# Patient Record
Sex: MALE | Race: WHITE | NOT HISPANIC OR LATINO | Employment: OTHER | URBAN - METROPOLITAN AREA
[De-identification: names, ages, dates, MRNs, and addresses within clinical notes are randomized per-mention and may not be internally consistent; named-entity substitution may affect disease eponyms.]

---

## 2023-03-07 ENCOUNTER — TELEPHONE (OUTPATIENT)
Dept: URGENT CARE | Facility: URGENT CARE | Age: 58
End: 2023-03-07

## 2023-03-07 NOTE — TELEPHONE ENCOUNTER
Dr. Canchola,  You have never seen this pt. Pt has upcoming appt scheduled with you for 3/14. Are you ok with following pt for home care?     Stephanie Coates RN  [facility]

## 2023-03-07 NOTE — TELEPHONE ENCOUNTER
Josiane with [facility]  is calling to know if Dr. Canchola is ok with following patient for HHC orders.     Has establish care visit scheduled for 3/14/23.    Call back okay.     Daina Ahumada RN  [facility]

## 2023-03-07 NOTE — TELEPHONE ENCOUNTER
I am okay if the patient is not complicated with severe co-morbidities. If he does, he may benefit from being seen by Internal medicine    Thanks for the heads up     MG

## 2023-03-08 NOTE — TELEPHONE ENCOUNTER
Called and left detailed message with provider's message. Call back to [phone] if any further questions or concerns.    Stephanie Coates RN  [facility]

## 2023-03-09 ENCOUNTER — TELEPHONE (OUTPATIENT)
Dept: FAMILY MEDICINE | Facility: CLINIC | Age: 58
End: 2023-03-09

## 2023-03-09 NOTE — TELEPHONE ENCOUNTER
I have never seen this patient before. Unable to prescribe pain medications at this time until evaluated.     When he is seen for his appointment on 3/14 we can discuss medication options for pain control at that time       Dr. Barbara Canchola

## 2023-03-09 NOTE — TELEPHONE ENCOUNTER
FYI - Status Update    Who is Calling: nurse, Calling from [facility]    Update: Calling to inform us that patient is starting home health care 3x week for wound vac. PT will be coming out to do an evaluation. He will also have a PCA for bathing needs. Patient is requesting for Pain medication to take 3x a week to assist with wound vac changes     Does caller want a call/response back: Yes     Okay to leave a detailed message?: Yes at Other phone number:  Radha can be reached at [phone]

## 2023-03-10 NOTE — TELEPHONE ENCOUNTER
Called Radha with [employer] and relayed message from Dr. Canchola. She verbalized understanding and will notify patient.     Isaura Chauhan RN   [facility]

## 2023-03-15 ENCOUNTER — TELEPHONE (OUTPATIENT)
Dept: FAMILY MEDICINE | Facility: CLINIC | Age: 58
End: 2023-03-15

## 2023-03-15 NOTE — TELEPHONE ENCOUNTER
Radha ([facility]) calling regarding patient;s wound vac supplies    Patient had previously had care through [facility] and was receiving wound vac supplies through them, now discharged from TCU and is needing new orders for wound vac supplies, patient receives from [facility].    Writer did inform caller that patient cancelled appointment today and until patient is seen and establish care our provider's cannot sign orders, see TE 3/9/23. Caller stated understanding and that she would let patient know.    Radha #: [phone]    BK ClayN RN  [facility]

## 2023-03-22 ENCOUNTER — VIRTUAL VISIT (OUTPATIENT)
Dept: FAMILY MEDICINE | Facility: CLINIC | Age: 58
End: 2023-03-22
Payer: MEDICAID

## 2023-03-22 DIAGNOSIS — Z90.49 S/P LAPAROSCOPIC APPENDECTOMY: Primary | ICD-10-CM

## 2023-03-22 PROCEDURE — 99441 PR PHYSICIAN TELEPHONE EVALUATION 5-10 MIN: CPT | Mod: 93 | Performed by: STUDENT IN AN ORGANIZED HEALTH CARE EDUCATION/TRAINING PROGRAM

## 2023-03-22 RX ORDER — TRAMADOL HYDROCHLORIDE 50 MG/1
50 TABLET ORAL EVERY 6 HOURS PRN
Qty: 30 TABLET | Refills: 0 | Status: SHIPPED | OUTPATIENT
Start: 2023-03-22 | End: 2023-07-07

## 2023-03-22 NOTE — PROGRESS NOTES
DME (Durable Medical Equipment) Orders and Documentation  Orders Placed This Encounter   Procedures     Wound Vac DME      The patient was assessed and it was determined the patient is in need of the following listed DME Supplies/Equipment. Please complete supporting documentation below to demonstrate medical necessity.      DME All Other Item(s) Documentation    List reason for need and supporting documentation for medical necessity below for each DME item.     1. Patient is in need for Wound Vac supplies (canisters). Already has a wound vac and needs to continue to have supplies. Patient receives from [facility]

## 2023-03-22 NOTE — PROGRESS NOTES
"Delon is a 58 year old who is being evaluated via a billable video visit.      How would you like to obtain your AVS? Mail a copy  If the video visit is dropped, the invitation should be resent by: Text to cell phone: [phone]   Will anyone else be joining your video visit? No  {If patient encounters technical issues they should call [phone] :505077}        {PROVIDER CHARTING PREFERENCE:425692}    Subjective   Delon is a 58 year old, presenting for the following health issues:  WOUND CARE  No flowsheet data found.  HPI       Patient is requesting supplies for his wound.   LAPAROTOMY EXPLORATION, APPENDECTOMY was done  - 2/7/2023      {SUPERLIST (Optional):687625}  {additonal problems for provider to add (Optional):983654}      Review of Systems   {ROS COMP (Optional):945976}      Objective           Vitals:  No vitals were obtained today due to virtual visit.    Physical Exam   {video visit exam brief selected:760637::\"GENERAL: Healthy, alert and no distress\",\"EYES: Eyes grossly normal to inspection.  No discharge or erythema, or obvious scleral/conjunctival abnormalities.\",\"RESP: No audible wheeze, cough, or visible cyanosis.  No visible retractions or increased work of breathing.  \",\"SKIN: Visible skin clear. No significant rash, abnormal pigmentation or lesions.\",\"NEURO: Cranial nerves grossly intact.  Mentation and speech appropriate for age.\",\"PSYCH: Mentation appears normal, affect normal/bright, judgement and insight intact, normal speech and appearance well-groomed.\"}    {Diagnostic Test Results (Optional):358249}    {AMBULATORY ATTESTATION (Optional):506878}        Video-Visit Details    Type of service:  Video Visit   Video Start Time: {video visit start/end time for provider to select:486567}  Video End Time:{video visit start/end time for provider to select:407361}    Originating Location (pt. Location): Home  {PROVIDER LOCATION On-site should be selected for visits conducted from your clinic " "location or adjoining [facility] hospital, academic office, or other nearby [facility] building. Off-site should be selected for all other provider locations, including home:[address]}  Distant Location (provider location):  On-site  Platform used for Video Visit: {Virtual Visit Platforms:851811::\"[employer]Well\"}    " General

## 2023-03-22 NOTE — PROGRESS NOTES
Delon is a 58 year old who is being evaluated via a billable telephone visit.      What phone number would you like to be contacted at? [phone]   How would you like to obtain your AVS? Mail a copy    Distant Location (provider location):  On-site    Assessment & Plan     (Z90.49) S/P laparoscopic appendectomy  (primary encounter diagnosis)  Comment: Chronic, stable. Pt being treated with home health by [facility] and need wound vac supplies. Will place order. Due to pain with changes of wound vac will provide tramadol for pain control. PMDP checked prior to prescribing.   Plan: Wound Vac DME, traMADol (ULTRAM) 50 MG tablet        Pending                    ALEX BLACKWELL MD  [facility]    Subjective   Delon is a 58 year old, presenting for the following health issues:  WOUND CARE  No flowsheet data found.  HPI      Patient is requesting supplies for his wound.   LAPAROTOMY EXPLORATION, APPENDECTOMY was done  - 2/7/2023  Patient reports that he has been doing okay and states that he gets his wound VAC changed every other day for wound care.  He reports that his home health has encouraged him to obtain strips for his wound VAC and is hoping for a DME order.  Patient states that he also experiences pain with changing of his wound VAC strips and is hoping to obtain pain medications to help alleviate symptoms.              Review of Systems   Constitutional, HEENT, cardiovascular, pulmonary, gi and gu systems are negative, except as otherwise noted.      Objective           Vitals:  No vitals were obtained today due to virtual visit.    Physical Exam   healthy, alert and no distress  PSYCH: Alert and oriented times 3; coherent speech, normal   rate and volume, able to articulate logical thoughts, able   to abstract reason, no tangential thoughts, no hallucinations   or delusions  His affect is normal  RESP: No cough, no audible wheezing, able to talk in full sentences  Remainder of exam unable to  be completed due to telephone visits    No results found for any visits on 03/22/23.            Phone call duration: 9 minutes

## 2023-03-23 ENCOUNTER — TELEPHONE (OUTPATIENT)
Dept: FAMILY MEDICINE | Facility: CLINIC | Age: 58
End: 2023-03-23
Payer: MEDICAID

## 2023-03-23 DIAGNOSIS — Z53.9 DIAGNOSIS NOT YET DEFINED: Primary | ICD-10-CM

## 2023-03-23 PROCEDURE — 99207 PR MD CERTIFICATION HHA PATIENT: CPT | Performed by: STUDENT IN AN ORGANIZED HEALTH CARE EDUCATION/TRAINING PROGRAM

## 2023-03-23 NOTE — TELEPHONE ENCOUNTER
[facility] signed by Dr. Canchola and faxed back to [phone].  Connie Arana,

## 2023-03-23 NOTE — TELEPHONE ENCOUNTER
Reason for Call:  Form, our goal is to have forms completed with 72 hours, however, some forms may require a visit or additional information.    Type of letter, form or note:  Home Health Certification    Who is the form from?: Home care    Where did the form come from: form was faxed in    What clinic location was the form placed at?: [facility]    Where the form was placed: Given to physician    What number is listed as a contact on the form?: [phone]       Call taken on 3/23/2023 at 10:06 AM by Connie Arana

## 2023-03-24 ENCOUNTER — TELEPHONE (OUTPATIENT)
Dept: URGENT CARE | Facility: URGENT CARE | Age: 58
End: 2023-03-24

## 2023-03-24 NOTE — TELEPHONE ENCOUNTER
Routing to team pink    Please route to TC pool to watch for forms for Dr. Canchola to sign.    [employer] has not received order for wound Vac supplies. [employer] will be faxing form to be completed.      RN received call from Yelena with [facility] regarding above.    Patient has not received wound vac supplies and they need to be ordered.    Per Yelena, supplies need to be ordered thru [employer].    RN called and spoke with Radha with [employer]. [phone]    [employer] will fax forms to clinic to be filled out and to be faxed back    When forms are received an completed,  Please fax back to number on forms.       David Hennessy, RN, BSN, PHN  [facility]

## 2023-03-27 ENCOUNTER — TELEPHONE (OUTPATIENT)
Dept: URGENT CARE | Facility: URGENT CARE | Age: 58
End: 2023-03-27

## 2023-03-27 NOTE — TELEPHONE ENCOUNTER
"Form received which is a \"[facility] VAC Therapy Insurance Authorization Form\".  I called Yelena to let her know.  She said that this doesn't make sense and confused as to why they can't find him in their system?  She doesn't believe this form needs to be filled out as he was sent home from the hospital already with the system.  She will do some digging around and get back to us if she finds out what they need from us. Will hold on to this form for now.  Connie Arana,     "

## 2023-03-27 NOTE — TELEPHONE ENCOUNTER
I called [employer] . (ph: [phone]) and spoke with Harjeet.  She was not able to find this patient in their system.  She will fax a form to us.  Yelena called and informed.  Skip would like a call back once we receive the form.    Connie Arana,

## 2023-03-31 ENCOUNTER — TELEPHONE (OUTPATIENT)
Dept: FAMILY MEDICINE | Facility: CLINIC | Age: 58
End: 2023-03-31
Payer: MEDICAID

## 2023-03-31 NOTE — TELEPHONE ENCOUNTER
What orders need to be written out. Specific supplies should be detailed by the company as to what exactly they need in order to provide treatment     Thanks    MG

## 2023-03-31 NOTE — TELEPHONE ENCOUNTER
Radha [facility]  care [facility] RN calling to get orders for wound vac care.     They need written orders to be able to order the supplies.     Please write orders and then fax them to the below agency as they are the ones that order the supplies.     [facility] needs orders for wound vac supplies     Fax: [phone]    Please have staff reach out to Radha when this has been completed.     Best number for her is [phone]      Belen Holland RN  [facility]

## 2023-04-03 NOTE — TELEPHONE ENCOUNTER
Attempted to call RN, left  asking for return call.     Thanks,  ELENA Shipley  [facility]

## 2023-04-03 NOTE — TELEPHONE ENCOUNTER
"Radha Auguste, RN returned call.Per RN, \" He needs sponge kit, canisters, doing cares 3x per week\".     Per RN has been waiting for orders since March 3rd, patient has still not received his wound vac. Has tried to fax the order that was written and they wouldn't accept this and she tries to reach out to the company for info to give us and is \"tried of being the middle man\". Per RN \" I don't know what else to do\".     [facility]: Spoke with rep who stated, \"we need to create an account so we need chart notes and face to face notes, face sheet, insurance\", \"this should come from the facility upon discharge to home\".     [facility] in [place]: Attempted to call facility, was going to be transferred to RN but got disconnected. Attempted to call x4 and unable to get through to anyone. Orders will have to come from [facility] as pt did not have a face to face with Dr. Canchola. Will have to try calling again to give above message from [facility].       Thanks,  ELENA Shipley  [facility]     "

## 2023-04-05 NOTE — TELEPHONE ENCOUNTER
Yelena with [facility] calling back to check status of wound vac supplies.  Updated her on Violetta's note below.  Yelena will update patient on status.    Per Yelena, [facility] already has an account for patient in their system but they are just waiting for orders and documents.    Jackson Nunes RN  [facility]

## 2023-04-05 NOTE — TELEPHONE ENCOUNTER
I attempted to call [facility] in [place] x3 and no one answers/ no voice mail available.     This patient needs the wound vac order faxed along with f2f notes which we do not have but we can try sending the virtual visit to the company and they need a face sheet with insurance information.       I will print and fax to [facility]. Called and updated home care RN.     Called [facility] to assure fax received- April with intake stated it can take up to an hour to receive the fax. In the meantime, April stated that rehab center never alerted them that he was discharged home and this is why pt has not received supplies yet. Once fax received they can do intake and send out supplies. I told rep that they can be transferred to me.     Called and updates RN via .       Thanks,  ELENA Shipley  [facility]

## 2023-04-13 ENCOUNTER — MEDICAL CORRESPONDENCE (OUTPATIENT)
Dept: HEALTH INFORMATION MANAGEMENT | Facility: CLINIC | Age: 58
End: 2023-04-13
Payer: MEDICAID

## 2023-04-25 ENCOUNTER — TELEPHONE (OUTPATIENT)
Dept: FAMILY MEDICINE | Facility: CLINIC | Age: 58
End: 2023-04-25
Payer: MEDICAID

## 2023-04-25 DIAGNOSIS — Z90.49 S/P LAPAROSCOPIC APPENDECTOMY: Primary | ICD-10-CM

## 2023-04-25 NOTE — TELEPHONE ENCOUNTER
Yelena PARKER from [facility] calling with an update on pt's wound. Wound is progressing fantastically. Drainage in wound vac cannister has decreased significantly. Has been   changing once every 5 days.     Would Dr. Canchola want to see pt again soon to assess wound to determine if ok to discontinue the wound vac or different wound care orders? Continue with the wound vac?    Depth has gotten superficial and is healing well  Wound measurements: L 19.5cm x 3cm D not there anymore.    Yelena can be reached at [phone] ok to leave a message    Stephanie Coates RN  [facility]

## 2023-04-26 ENCOUNTER — TELEPHONE (OUTPATIENT)
Dept: FAMILY MEDICINE | Facility: CLINIC | Age: 58
End: 2023-04-26
Payer: MEDICAID

## 2023-04-26 NOTE — TELEPHONE ENCOUNTER
Yelena called back and was updated with referral and number to schedule  She will let patient know    Jackson Nunes RN  [facility]

## 2023-04-26 NOTE — LETTER
April 26, 2023    To  Delon Dasilva  [address] [place] [place] [address]    Your team at [facility] cares about your health. We have reviewed your chart and based on our findings; we are making the following recommendations to better manage your health.     You are in particular need of attention regarding the following:     Call or MyChart message your clinic to schedule a colonoscopy, schedule/ a FIT Test, or order a Cologuard test. If you are unsure what type of test you need, please call your clinic and speak to clinic staff.   Colon cancer is now the second leading cause of cancer-related deaths in the [facility] for both men and women and there are over 130,000 new cases and 50,000 deaths per year from colon cancer. Colonoscopies can prevent 90-95% of these deaths. Problem lesions can be removed before they ever become cancer. This test is not only looking for cancer, but also getting rid of precancerous lesions.   If you are under/uninsured, we recommend you contact the [employer] Program.[employer] is a free colorectal cancer screening program that provides colonoscopies for eligible under/uninsured [place] men and women. If you are interested in receiving a free colonoscopy, please call [employer] at t [phone] (mention code [employer]) to see if you're eligible. Please have them send us the results.   Please call [phone] to schedule a colonoscopy.  Contact your clinic to schedule/ your FIT Test.   Schedule an office visit with your provider if you are interested in completing your colon cancer screening with a Cologuard test  PREVENTATIVE VISIT: Physical    If you have already completed these items, please contact the clinic via phone or   [employer] so your care team can review and update your records. Thank you for   choosing [facility] for your healthcare needs. For any questions,   concerns, or to schedule an appointment please contact our  clinic.    Healthy Regards,      Your  Health [facility] Team

## 2023-04-26 NOTE — TELEPHONE ENCOUNTER
Left message on Yelena's VM requesting a return call to [facility] [phone]   VM did not have a greeting, did not leave a detailed message    Jackson Nunes RN  [facility]

## 2023-04-26 NOTE — TELEPHONE ENCOUNTER
Spoke with ELENA Kirk and gave PCP message as below.     ELENA Kirk states that patient had never followed up with anyone since his surgery.     Per encounters patient was scheduled to have a surgical follow up with Daniel Wells MD on March 14th but patient did not attend appointment.     Currently she does not have anyone to get orders from.     Ok for referral for general surgery to see patient?     Daina Ahumada RN  [facility]

## 2023-04-26 NOTE — TELEPHONE ENCOUNTER
I think it would be important for patient to be evaluated by surgery as they were the ones who placed the wound vac post operatively. It is normally their call as to when to determine when a wound vac can be discontinued or not. Recommend scheduling with surgeon who performed pts surgery.     Great to hear that he is doing great overall    MG

## 2023-04-26 NOTE — TELEPHONE ENCOUNTER
Patient Quality Outreach    Patient is due for the following:   Colon Cancer Screening  Physical Preventive Adult Physical    Next Steps:   Schedule a Adult Preventative    Type of outreach:    Sent letter.      Questions for provider review:    None     Riana Chavez  Chart routed to Care Team.

## 2023-05-10 ENCOUNTER — TELEPHONE (OUTPATIENT)
Dept: FAMILY MEDICINE | Facility: CLINIC | Age: 58
End: 2023-05-10
Payer: COMMERCIAL

## 2023-05-10 DIAGNOSIS — Z53.9 DIAGNOSIS NOT YET DEFINED: Primary | ICD-10-CM

## 2023-05-10 PROCEDURE — 99207 PR MD RECERTIFICATION HHA PT: CPT | Performed by: STUDENT IN AN ORGANIZED HEALTH CARE EDUCATION/TRAINING PROGRAM

## 2023-05-10 NOTE — TELEPHONE ENCOUNTER
[facility] signed by Dr. Canchola and faxed back to [phone].  Connie Arana,

## 2023-05-10 NOTE — TELEPHONE ENCOUNTER
Reason for Call:  Form, our goal is to have forms completed with 72 hours, however, some forms may require a visit or additional information.    Type of letter, form or note:  Home Health Certification    Who is the form from?: Home care    Where did the form come from: form was faxed in    What clinic location was the form placed at?: [facility]    Where the form was placed: Given to physician    What number is listed as a contact on the form?: [phone]       Call taken on 5/10/2023 at 8:35 AM by Connie Arana

## 2023-05-26 ENCOUNTER — OFFICE VISIT (OUTPATIENT)
Dept: SURGERY | Facility: CLINIC | Age: 58
End: 2023-05-26
Payer: COMMERCIAL

## 2023-05-26 VITALS — WEIGHT: 315 LBS | HEART RATE: 74 BPM | DIASTOLIC BLOOD PRESSURE: 76 MMHG | SYSTOLIC BLOOD PRESSURE: 136 MMHG

## 2023-05-26 DIAGNOSIS — T14.8XXA OPEN WOUND: Primary | ICD-10-CM

## 2023-05-26 PROCEDURE — 99203 OFFICE O/P NEW LOW 30 MIN: CPT | Performed by: SURGERY

## 2023-05-26 NOTE — PROGRESS NOTES
Dear Barbara Auguste  I have seen Delon Dasilva and as you know his chief complaint is his wound is healing and does he need the wound vac.   His wounds look great and is al most healed.  Good granulation tissue and is just below the skin level.   Do damp to dry dressing changes and follow up with me in a month and will see how it is healing.    May have to use silver nitrate on it if the tissue grows above the wound.   Has no complains of abdomen pain.   Patient has a good chance of getting a hernia.   But may exercise. No limits on  That.         HPI:  Patient is a 58 year old male  with complaints see above    No past medical history on file.    No past surgical history on file.    Social History     Socioeconomic History     Marital status: Single     Spouse name: Not on file     Number of children: Not on file     Years of education: Not on file     Highest education level: Not on file   Occupational History     Not on file   Tobacco Use     Smoking status: Never     Smokeless tobacco: Never   Vaping Use     Vaping status: Never Used   Substance and Sexual Activity     Alcohol use: Not on file     Drug use: Not on file     Sexual activity: Not on file   Other Topics Concern     Not on file   Social History Narrative     Not on file     Social Determinants of Health     Financial Resource Strain: Not on file   Food Insecurity: Not on file   Transportation Needs: Not on file   Physical Activity: Not on file   Stress: Not on file   Social Connections: Not on file   Intimate Partner Violence: Not on file   Housing Stability: Not on file        Current Outpatient Medications   Medication Sig Dispense Refill     traMADol (ULTRAM) 50 MG tablet Take 1 tablet (50 mg) by mouth every 6 hours as needed for moderate to severe pain or severe pain (7-10) Can take every 4 to 6 hours and up to 2 hours prior to wound vac change 30 tablet 0       Physical exam:  Patient able to get up on table without difficulty.  Head  eyes, nose and mouth within normal limits.  Abdomen is abdomen is soft without significant tenderness, masses, organomegaly or guarding  bowel sounds are positive and no caput medusa noted.    Lower extremity edema is not present.    Ct scan shows:    EXAM: CT CHEST ABDOMEN PELVIS W   LOCATION: [facility]   DATE/TIME: 2/18/2023 4:43 PM     INDICATION: Suspect PE and also abdominal abscess.   COMPARISON: CTs of the abdomen and pelvis dated 2/13/2023 and 2/7/2023. Chest x-ray dated 02/14/2023.   TECHNIQUE: CT scan of the chest, abdomen, and pelvis was performed following injection of IV contrast. Multiplanar reformats were obtained. Dose reduction techniques were used.   CONTRAST: Omnipaque 350 149 ml.     FINDINGS:   LUNGS AND PLEURA: Low lung volumes. Bibasilar linear atelectasis. No consolidation or infiltrate. Few punctate calcified granulomas. No pleural effusion or pneumothorax.     MEDIASTINUM/AXILLAE: Right upper extremity PICC tip terminates in the right atrium. Thin nonocclusive filling defect in the right internal jugular vein extending into the proximal brachiocephalic vein, status post removal of previously seen right IJ central venous catheter (series 5, image 62). This likely represents a fibrin sheath.     Cardiac size is within normal limits. No pericardial effusion. Normal caliber thoracic aorta. Exam was not tailored for evaluation of pulmonary embolism. However, there is no central pulmonary embolism. No lymphadenopathy.     CORONARY ARTERY CALCIFICATION: Mild.     HEPATOBILIARY: 2.5 cm peripherally calcified stone in the neck of the gallbladder. There is mild fat stranding along the neck of the gallbladder (series 5, image 66). No pericholecystic fluid. No other duct dilatation. No worrisome liver lesions.     PANCREAS: Normal.     SPLEEN: Normal.     ADRENAL GLANDS: Normal.     KIDNEYS/BLADDER: Small left lower pole renal cortical cysts, which does not require follow-up. Otherwise normal.      BOWEL: Postoperative changes of appendectomy. Trace free fluid in the adjacent right lower quadrant. No loculated fluid collection. Scattered areas of mesenteric edema throughout the right lower quadrant. No pneumoperitoneum.     Dilated loops of small bowel, measuring up to 5.6 cm in diameter, with associated air-fluid levels. These taper to decompressed distal small bowel in the left lower quadrant. Small volume of fluid and gas in the colon. No pneumatosis or portal venous gas.     LYMPH NODES: Normal.     VASCULATURE: No abdominal aortic aneurysm.     PELVIC ORGANS: Normal.     MUSCULOSKELETAL: Mild anasarca. Midline laparotomy wound with wound VAC in place. No associated fluid collection. Mild hypertrophic degenerative changes of the thoracolumbar spine.     IMPRESSION:   1.  Exam was not tailored for evaluation of pulmonary embolism. There is no central pulmonary embolism.     2.  Low lung volumes with bibasilar platelike atelectasis. No acute infiltrate.     3.  Probable thin fibrin sheath within the right internal jugular and brachiocephalic veins status post central venous catheter removal.     4.  Appendectomy. Trace right lower quadrant free fluid and scattered areas of right lower quadrant mesenteric edema. No loculated abscess.     5.  Midline laparotomy wound with wound VAC in place. No associated fluid collection.     6.  Dilated small bowel loops, which taper to decompressed distal small bowel in the left lower quadrant. Given recent surgery, findings favor adynamic ileus. However, mechanical small bowel obstruction would have a similar pattern.     7.  Cholelithiasis with mild stranding along the neck of the gallbladder. This is nonspecific and may be related to cholecystitis or edematous state. Consider abdominal ultrasound and/or hepatobiliary scan.     8.  Hypervolemia, as evidenced by anasarca.  For Patients: As a result of the 21st Century Cures Act, medical imaging exams and procedure  reports are released immediately into your electronic medical record. You may view this report before your referring provider. If you have questions, please contact your health care provider.     EXAM: CT ABDOMEN PELVIS WO   LOCATION: [facility]   DATE/TIME: 2/7/2023 9:46 PM     INDICATION: Abdominal distention, loose stools, vomiting.   COMPARISON: None.   TECHNIQUE: CT scan of the abdomen and pelvis was performed without IV contrast. Multiplanar reformats were obtained. Dose reduction techniques were used.   CONTRAST: None.     FINDINGS:   LOWER CHEST: Bibasilar atelectasis. Elevation right hemidiaphragm.     HEPATOBILIARY: Cholelithiasis. No bile duct dilatation. Liver unremarkable.     PANCREAS: Normal.     SPLEEN: Normal.     ADRENAL GLANDS: Normal.     KIDNEYS/BLADDER: Normal.     BOWEL: Gas and fluid-filled stomach. Multiple prominently gas and fluid dilated loops of proximal small bowel with transition point identified in the mid abdomen centrally (series 2, image [id number]) consistent with mechanical small bowel obstruction. Additionally, there is scattered free intraperitoneal air most prominent in the pelvis adjacent to the sigmoid colon with additional free air in the upper abdomen. The mid sigmoid colon appears mildly thick-walled with inflammatory changes and edema in the mesentery in the lower abdomen and pelvis. Small amount of free fluid in pelvis. Findings consistent with ruptured viscus likely sigmoid colon; however, given small bowel obstruction, a small bowel origin for free air is not excluded. Surgical consultation recommended.     LYMPH NODES: Normal.     VASCULATURE: Mild aortic calcification.     PELVIC ORGANS: Normal.     MUSCULOSKELETAL: Hypertrophic and degenerative changes thoracic and upper lumbar spine. Periumbilical hernia containing mesenteric fat and small amount of ascites.      Most recent labs:   No results found for any previous visit.      Ref Range & Units 3 mo ago   WHITE  BLOOD COUNT         4.5 - 11.0 thou/cu mm 11.7 High     RED BLOOD COUNT           4.30 - 5.90 mil/cu mm 3.77 Low     HEMOGLOBIN                13.5 - 17.5 g/dL 11.7 Low     HEMATOCRIT                37.0 - 53.0 % 35.8 Low     MCV                       80 - 100 fL 95    MCH                       26.0 - 34.0 pg 31.0    MCHC                      32.0 - 36.0 g/dL 32.7    RDW                       11.5 - 15.5 % 13.3    PLATELET COUNT            140 - 440 thou/cu mm 345    MPV                       6.5 - 11.0 fL 9.7    NRBC                      % 0.0    ABS NRBC thou /cu mm 0.0    Resulting Agency  [facility]   Specimen Collected: 02/23/23           Assessment:   Time spent with patient and family with greater than 50% of the time in discussion was 40 minutes. This also included looking at films and looking over the chart and discussion with other physicians involved with the patient care.    Abimael Rhodes MD      Sincerely   Abimael Rhodes MD    HOSPITALIST DISCHARGE SUMMARY   [facility]     Admission Date: 2/7/2023  Discharge Date: 2/23/2023    Discharge Plan: Delon Dasilva was discharged to skilled nursing facility.    Principal Diagnosis   Severe sepsis with septic shock  Perforated appendicitis with purulent peritonitis  Post op illeus  KIYA due to AKN  Acute hypoxic respiratory failure  Metabolic acidosis  Shock liver  Electrolyte abnormality  Morbid obesity.     Hospital Problem List   Principal Problem:  Severe sepsis (HC)  Active Problems:  Bowel perforation (HC)  Acute renal failure due to tubular necrosis (HC)  Morbid obesity with BMI of 50.0-59.9, adult (HC)  Appendicitis    ADDITIONAL COMMENTS REGARDING DIAGNOSIS SPECIFICITY  Additional Diagnosis Information         BMI>40, which is consistent with MORBID OBESITY. This is clinically significant due to increased nursing cares, use of resources and specialty equipment.       Hospital Course     Delon Dasilva is a 57 y.o. male with a past  medical history significant for obesity who presents with several weeks of abdominal bloating, shortness of breath, intermittent vomiting. He was found to have a perforated viscous on initial work up. He was taken to the OR where peritonitis due to perforated appendicitis was noted on exploratory laparotomy. Given the enormous amount of subcutaneous tissue and the purulent fluid and terrible infection, he is was at such an incredibly high risk for a wound infection and surgeon decided to leave his skin open and packed it with multiple Kerlix gauzes.  He was admitted to the ICU and remained intubated due to septic shock. He developed acute renal failure. Nephrology and critical care medicine were consulted. The patient was continued on empiric antibiotics.   picc line placed for iv antibiotics .  With fluid/ surgery/ antibiotics sepsis resolved. Extubated, weaned off pressors. Patient also developed KIYA due to ATN/ sepsis, which has resolved also with treatment of sepsis. Post op course complicated by post op ileus and drug rash from Zosyn which has been improving since he was switched to ertapenem. Midline surgical wound is healing well with wound vac.   Antibiotics switched to oral on discharge with plan to continue till 03/04.   On discharge patient had full return of bowel function and ambulating with help.   He was discharged to [facility] for further ongoing PT/ wound care.     Recommendations for Outpatient Provider   PCP: No Pcp       Discharge Medications       Your Home Medicines     START taking these medicines   Instructions   HYDROmorphone 2 mg tablet  For diagnoses: Perforation of sigmoid colon (HC)  Commonly known as: DILAUDID  Take one tab 30 min -1 hour prior to dressing change daily and two times daily as needed only for extreme pain.    levoFLOXacin 500 mg tablet  For diagnoses: Perforation of sigmoid colon (HC)  Commonly known as: LEVAQUIN  Take 1 Tablet (500 mg) by mouth once daily for 10  days.    metroNIDAZOLE 500 mg tablet  For diagnoses: Perforation of sigmoid colon (HC)  Commonly known as: FLAGYL  Take 1 Tablet (500 mg) by mouth two times daily for 10 days.    nystatin powder powder  For diagnoses: Intertrigo  Commonly known as: MYCOSTATIN  Apply topically to affected area(s) two times daily for 14 days.        Where to get your medicines     You have received printed prescription(s) for these medicines or supplies. Take these to your preferred pharmacy.   Bring a paper prescription for each of these medications    HYDROmorphone 2 mg tablet    levoFLOXacin 500 mg tablet    metroNIDAZOLE 500 mg tablet    nystatin powder powder      Pertinent Findings / Procedures   First weight: (!) 158.8 kg (350 lb) (02/07/23 2120) Last weight: (!) 150.5 kg (331 lb 12.8 oz) (02/20/23 0503)  By Dr. Wells 2/8/2023   1. Exploratory laparotomy.  2. Abdominal washout.  3. Open appendectomy.    US RENAL AND BLADDER COMPLETE  1. Small benign left renal cyst.  2. Kidneys are otherwise negative. No hydronephrosis.    XR CHEST 1 VIEW PORTABLE - Endotracheal tube tip approximately 4 cm above the jackeline. Enteric tube tip below the diaphragm. Right IJ central venous catheter tip over the SVC. Somewhat shallow inspiration. Heart size and pulmonary vascularity appear within normal limits. Mild patchy right perihilar and bilateral lower lung infiltrates versus atelectasis similar to previous. Elevation right hemidiaphragm. Questionable small bilateral pleural effusions. Hypertrophic changes thoracic spine.       Consultants     Encounter Notes   Consults from Ivette Carney MD (Infectious Diseases)  Consults from Simon Nair DO (Physical Medicine and Rehabilitation)  Consults from Tyson Dai MD (Nephrology)  Consults from Tejas Sarmiento MD (Critical Care Medicine)  Consults from Daniel Wells MD (Surgery - Critical Care)        Diet / Activity / Follow-Up     After Discharge Orders and  Instructions   Activity and weight bearing as tolerated   Activity and weight bearing status as tolerated. Nursing staff to re-evaluate and modify as appropriate.   Admission H&P Valid: Yes   After Hospital Follow Up Appointment(s)   Follow-up appointment in General Surgery Acute Care clinic in 2 weeks (or after you are discharged from the rehab facility) for post operative check and wound evaluation. Please call [phone] to schedule appointment. Office is in [facility]. [address], [address] ([facility]) [place] [address]   When to follow up: 11 to 14 days   When is patient being discharged?: Other/Unknown   After Hospital Follow up appointment(s)   The skilled nursing facility staff will help arrange your appointments or your health care provider may be able to come to you. Please follow up within 5 days.   When to follow up: 1 to 5 days   Agency Standing Orders: Yes   All Orders Valid For 45 Days Unless Otherwise Indicated   Allergies:   No Known Allergies    Caring for your wound or incision:   Wound Location: Abdomen   Wound Description: 30cm x 11 x 4.3 cm  Wound Managing Provider: General Surgery   Obtain wound assessments including wound dimensions, presence and extent of tunnels and undermining, and color/condition of wound bed: Weekly on     Negative Pressure Settin mmHg   Duration of negative pressure: Continuous   Foam type: Large Black Foam (Granufoam)   Additional dressings: n/a   Dressing change frequency: Start Date: 2023 and Change dressing three times a week: Monday, Wednesday, Friday. Next dressing change is due 2023.   Diet Regular   Discharge Condition: Stabilized   Discharge Potential: Length of Stay LESS Than 30 Days   Discharge Summary: Enclosed   Free of Communicable Disease: Yes   Give 2-Step Mantoux: Yes, Unless Current or Contraindicated   Level of Care: Skilled   Patient Aware of Diagnosis: Yes    Patient may leave SNF supervised with medications   Rehab Potential: Good   Treatment - Occupational Therapy Eval and Treat   Treatment - Physical Therapy Eval and Treat   Treatment Options: Full Resuscitation   Vital Signs per facility routine   Weight per facility routine   Weigh on admission to skilled nursing facility   When should you be concerned?   At the skilled nursing facility let your health care providers know if you notice any changes in your condition.   Who can the receiving facility call with order questions?   If any questions arise within the first 24 hours after discharge contact our service at [phone].   Why were you at the hospital?   You were in the hospital for abdominal pain and bloating due to a burst appendix and infection.       Pending Studies     Lab results that may not be resulted at time of discharge: (From admission through now)   None       Total time spent on discharge coordination: 45 minutes. Patient was seen and examined today.    Trini James MD  Hospitalist, [facility]   [phone]

## 2023-05-26 NOTE — LETTER
5/26/2023         RE: Delon Dasilva  [address] [place] [address]        Dear Colleague,    Thank you for referring your patient, Delon Dasilva, to the [facility]. Please see a copy of my visit note below.    Dear Barbara Auguste  I have seen Delon Dasilva and as you know his chief complaint is his wound is healing and does he need the wound vac.   His wounds look great and is al most healed.  Good granulation tissue and is just below the skin level.   Do damp to dry dressing changes and follow up with me in a month and will see how it is healing.    May have to use silver nitrate on it if the tissue grows above the wound.   Has no complains of abdomen pain.   Patient has a good chance of getting a hernia.   But may exercise. No limits on  That.         HPI:  Patient is a 58 year old male  with complaints see above    No past medical history on file.    No past surgical history on file.    Social History     Socioeconomic History     Marital status: Single     Spouse name: Not on file     Number of children: Not on file     Years of education: Not on file     Highest education level: Not on file   Occupational History     Not on file   Tobacco Use     Smoking status: Never     Smokeless tobacco: Never   Vaping Use     Vaping status: Never Used   Substance and Sexual Activity     Alcohol use: Not on file     Drug use: Not on file     Sexual activity: Not on file   Other Topics Concern     Not on file   Social History Narrative     Not on file     Social Determinants of Health     Financial Resource Strain: Not on file   Food Insecurity: Not on file   Transportation Needs: Not on file   Physical Activity: Not on file   Stress: Not on file   Social Connections: Not on file   Intimate Partner Violence: Not on file   Housing Stability: Not on file        Current Outpatient Medications   Medication Sig Dispense Refill     traMADol (ULTRAM) 50 MG tablet Take 1 tablet (50 mg) by  mouth every 6 hours as needed for moderate to severe pain or severe pain (7-10) Can take every 4 to 6 hours and up to 2 hours prior to wound vac change 30 tablet 0       Physical exam:  Patient able to get up on table without difficulty.  Head eyes, nose and mouth within normal limits.  Abdomen is abdomen is soft without significant tenderness, masses, organomegaly or guarding  bowel sounds are positive and no caput medusa noted.    Lower extremity edema is not present.    Ct scan shows:    EXAM: CT CHEST ABDOMEN PELVIS W   LOCATION: [facility]   DATE/TIME: 2/18/2023 4:43 PM     INDICATION: Suspect PE and also abdominal abscess.   COMPARISON: CTs of the abdomen and pelvis dated 2/13/2023 and 2/7/2023. Chest x-ray dated 02/14/2023.   TECHNIQUE: CT scan of the chest, abdomen, and pelvis was performed following injection of IV contrast. Multiplanar reformats were obtained. Dose reduction techniques were used.   CONTRAST: Omnipaque 350 149 ml.     FINDINGS:   LUNGS AND PLEURA: Low lung volumes. Bibasilar linear atelectasis. No consolidation or infiltrate. Few punctate calcified granulomas. No pleural effusion or pneumothorax.     MEDIASTINUM/AXILLAE: Right upper extremity PICC tip terminates in the right atrium. Thin nonocclusive filling defect in the right internal jugular vein extending into the proximal brachiocephalic vein, status post removal of previously seen right IJ central venous catheter (series 5, image 62). This likely represents a fibrin sheath.     Cardiac size is within normal limits. No pericardial effusion. Normal caliber thoracic aorta. Exam was not tailored for evaluation of pulmonary embolism. However, there is no central pulmonary embolism. No lymphadenopathy.     CORONARY ARTERY CALCIFICATION: Mild.     HEPATOBILIARY: 2.5 cm peripherally calcified stone in the neck of the gallbladder. There is mild fat stranding along the neck of the gallbladder (series 5, image 66). No pericholecystic fluid.  No other duct dilatation. No worrisome liver lesions.     PANCREAS: Normal.     SPLEEN: Normal.     ADRENAL GLANDS: Normal.     KIDNEYS/BLADDER: Small left lower pole renal cortical cysts, which does not require follow-up. Otherwise normal.     BOWEL: Postoperative changes of appendectomy. Trace free fluid in the adjacent right lower quadrant. No loculated fluid collection. Scattered areas of mesenteric edema throughout the right lower quadrant. No pneumoperitoneum.     Dilated loops of small bowel, measuring up to 5.6 cm in diameter, with associated air-fluid levels. These taper to decompressed distal small bowel in the left lower quadrant. Small volume of fluid and gas in the colon. No pneumatosis or portal venous gas.     LYMPH NODES: Normal.     VASCULATURE: No abdominal aortic aneurysm.     PELVIC ORGANS: Normal.     MUSCULOSKELETAL: Mild anasarca. Midline laparotomy wound with wound VAC in place. No associated fluid collection. Mild hypertrophic degenerative changes of the thoracolumbar spine.     IMPRESSION:   1.  Exam was not tailored for evaluation of pulmonary embolism. There is no central pulmonary embolism.     2.  Low lung volumes with bibasilar platelike atelectasis. No acute infiltrate.     3.  Probable thin fibrin sheath within the right internal jugular and brachiocephalic veins status post central venous catheter removal.     4.  Appendectomy. Trace right lower quadrant free fluid and scattered areas of right lower quadrant mesenteric edema. No loculated abscess.     5.  Midline laparotomy wound with wound VAC in place. No associated fluid collection.     6.  Dilated small bowel loops, which taper to decompressed distal small bowel in the left lower quadrant. Given recent surgery, findings favor adynamic ileus. However, mechanical small bowel obstruction would have a similar pattern.     7.  Cholelithiasis with mild stranding along the neck of the gallbladder. This is nonspecific and may be related  to cholecystitis or edematous state. Consider abdominal ultrasound and/or hepatobiliary scan.     8.  Hypervolemia, as evidenced by anasarca.  For Patients: As a result of the 21st Century Cures Act, medical imaging exams and procedure reports are released immediately into your electronic medical record. You may view this report before your referring provider. If you have questions, please contact your health care provider.     EXAM: CT ABDOMEN PELVIS WO   LOCATION: [facility]   DATE/TIME: 2/7/2023 9:46 PM     INDICATION: Abdominal distention, loose stools, vomiting.   COMPARISON: None.   TECHNIQUE: CT scan of the abdomen and pelvis was performed without IV contrast. Multiplanar reformats were obtained. Dose reduction techniques were used.   CONTRAST: None.     FINDINGS:   LOWER CHEST: Bibasilar atelectasis. Elevation right hemidiaphragm.     HEPATOBILIARY: Cholelithiasis. No bile duct dilatation. Liver unremarkable.     PANCREAS: Normal.     SPLEEN: Normal.     ADRENAL GLANDS: Normal.     KIDNEYS/BLADDER: Normal.     BOWEL: Gas and fluid-filled stomach. Multiple prominently gas and fluid dilated loops of proximal small bowel with transition point identified in the mid abdomen centrally (series 2, image [id number]) consistent with mechanical small bowel obstruction. Additionally, there is scattered free intraperitoneal air most prominent in the pelvis adjacent to the sigmoid colon with additional free air in the upper abdomen. The mid sigmoid colon appears mildly thick-walled with inflammatory changes and edema in the mesentery in the lower abdomen and pelvis. Small amount of free fluid in pelvis. Findings consistent with ruptured viscus likely sigmoid colon; however, given small bowel obstruction, a small bowel origin for free air is not excluded. Surgical consultation recommended.     LYMPH NODES: Normal.     VASCULATURE: Mild aortic calcification.     PELVIC ORGANS: Normal.     MUSCULOSKELETAL: Hypertrophic and  degenerative changes thoracic and upper lumbar spine. Periumbilical hernia containing mesenteric fat and small amount of ascites.      Most recent labs:   No results found for any previous visit.      Ref Range & Units 3 mo ago   WHITE BLOOD COUNT         4.5 - 11.0 thou/cu mm 11.7 High     RED BLOOD COUNT           4.30 - 5.90 mil/cu mm 3.77 Low     HEMOGLOBIN                13.5 - 17.5 g/dL 11.7 Low     HEMATOCRIT                37.0 - 53.0 % 35.8 Low     MCV                       80 - 100 fL 95    MCH                       26.0 - 34.0 pg 31.0    MCHC                      32.0 - 36.0 g/dL 32.7    RDW                       11.5 - 15.5 % 13.3    PLATELET COUNT            140 - 440 thou/cu mm 345    MPV                       6.5 - 11.0 fL 9.7    NRBC                      % 0.0    ABS NRBC thou /cu mm 0.0    Resulting Agency  [facility]   Specimen Collected: 02/23/23           Assessment:   Time spent with patient and family with greater than 50% of the time in discussion was 40 minutes. This also included looking at films and looking over the chart and discussion with other physicians involved with the patient care.    Abimael Rhodes MD      Sincerely   Abimael Rhodes MD    HOSPITALIST DISCHARGE SUMMARY   [facility]     Admission Date: 2/7/2023  Discharge Date: 2/23/2023    Discharge Plan: Delon Dasilva was discharged to skilled nursing facility.    Principal Diagnosis   Severe sepsis with septic shock  Perforated appendicitis with purulent peritonitis  Post op illeus  KIYA due to AKN  Acute hypoxic respiratory failure  Metabolic acidosis  Shock liver  Electrolyte abnormality  Morbid obesity.     Hospital Problem List   Principal Problem:  Severe sepsis (HC)  Active Problems:  Bowel perforation (HC)  Acute renal failure due to tubular necrosis (HC)  Morbid obesity with BMI of 50.0-59.9, adult (HC)  Appendicitis    ADDITIONAL COMMENTS REGARDING DIAGNOSIS SPECIFICITY  Additional Diagnosis  Information         BMI>40, which is consistent with MORBID OBESITY. This is clinically significant due to increased nursing cares, use of resources and specialty equipment.       Hospital Course     Delon Dasilva is a 57 y.o. male with a past medical history significant for obesity who presents with several weeks of abdominal bloating, shortness of breath, intermittent vomiting. He was found to have a perforated viscous on initial work up. He was taken to the OR where peritonitis due to perforated appendicitis was noted on exploratory laparotomy. Given the enormous amount of subcutaneous tissue and the purulent fluid and terrible infection, he is was at such an incredibly high risk for a wound infection and surgeon decided to leave his skin open and packed it with multiple Kerlix gauzes.  He was admitted to the ICU and remained intubated due to septic shock. He developed acute renal failure. Nephrology and critical care medicine were consulted. The patient was continued on empiric antibiotics.   picc line placed for iv antibiotics .  With fluid/ surgery/ antibiotics sepsis resolved. Extubated, weaned off pressors. Patient also developed KIYA due to ATN/ sepsis, which has resolved also with treatment of sepsis. Post op course complicated by post op ileus and drug rash from Zosyn which has been improving since he was switched to ertapenem. Midline surgical wound is healing well with wound vac.   Antibiotics switched to oral on discharge with plan to continue till 03/04.   On discharge patient had full return of bowel function and ambulating with help.   He was discharged to [facility] for further ongoing PT/ wound care.     Recommendations for Outpatient Provider   PCP: No Pcp       Discharge Medications       Your Home Medicines     START taking these medicines   Instructions   HYDROmorphone 2 mg tablet  For diagnoses: Perforation of sigmoid colon (HC)  Commonly known as: DILAUDID  Take one tab 30 min -1 hour prior to  dressing change daily and two times daily as needed only for extreme pain.    levoFLOXacin 500 mg tablet  For diagnoses: Perforation of sigmoid colon (HC)  Commonly known as: LEVAQUIN  Take 1 Tablet (500 mg) by mouth once daily for 10 days.    metroNIDAZOLE 500 mg tablet  For diagnoses: Perforation of sigmoid colon (HC)  Commonly known as: FLAGYL  Take 1 Tablet (500 mg) by mouth two times daily for 10 days.    nystatin powder powder  For diagnoses: Intertrigo  Commonly known as: MYCOSTATIN  Apply topically to affected area(s) two times daily for 14 days.        Where to get your medicines     You have received printed prescription(s) for these medicines or supplies. Take these to your preferred pharmacy.   Bring a paper prescription for each of these medications    HYDROmorphone 2 mg tablet    levoFLOXacin 500 mg tablet    metroNIDAZOLE 500 mg tablet    nystatin powder powder      Pertinent Findings / Procedures   First weight: (!) 158.8 kg (350 lb) (02/07/23 2120) Last weight: (!) 150.5 kg (331 lb 12.8 oz) (02/20/23 0503)  By Dr. Wells 2/8/2023   1. Exploratory laparotomy.  2. Abdominal washout.  3. Open appendectomy.    US RENAL AND BLADDER COMPLETE  1. Small benign left renal cyst.  2. Kidneys are otherwise negative. No hydronephrosis.    XR CHEST 1 VIEW PORTABLE - Endotracheal tube tip approximately 4 cm above the jackeline. Enteric tube tip below the diaphragm. Right IJ central venous catheter tip over the SVC. Somewhat shallow inspiration. Heart size and pulmonary vascularity appear within normal limits. Mild patchy right perihilar and bilateral lower lung infiltrates versus atelectasis similar to previous. Elevation right hemidiaphragm. Questionable small bilateral pleural effusions. Hypertrophic changes thoracic spine.       Consultants     Encounter Notes   Consults from Ivette Carney MD (Infectious Diseases)  Consults from Simon Nair DO (Physical Medicine and Rehabilitation)  Consults from Suhas  Tyson Sarmiento MD (Nephrology)  Consults from Tejas Sarmiento MD (Critical Care Medicine)  Consults from Daniel Wells MD (Surgery - Critical Care)        Diet / Activity / Follow-Up     After Discharge Orders and Instructions   Activity and weight bearing as tolerated   Activity and weight bearing status as tolerated. Nursing staff to re-evaluate and modify as appropriate.   Admission H&P Valid: Yes   After Hospital Follow Up Appointment(s)   Follow-up appointment in General Surgery Acute Care clinic in 2 weeks (or after you are discharged from the rehab facility) for post operative check and wound evaluation. Please call [phone] to schedule appointment. Office is in [facility]. [address], [address] ([facility]) [place] [address]   When to follow up: 11 to 14 days   When is patient being discharged?: Other/Unknown   After Hospital Follow up appointment(s)   The skilled nursing facility staff will help arrange your appointments or your health care provider may be able to come to you. Please follow up within 5 days.   When to follow up: 1 to 5 days   Agency Standing Orders: Yes   All Orders Valid For 45 Days Unless Otherwise Indicated   Allergies:   No Known Allergies    Caring for your wound or incision:   Wound Location: Abdomen   Wound Description: 30cm x 11 x 4.3 cm  Wound Managing Provider: General Surgery   Obtain wound assessments including wound dimensions, presence and extent of tunnels and undermining, and color/condition of wound bed: Weekly on     Negative Pressure Settin mmHg   Duration of negative pressure: Continuous   Foam type: Large Black Foam (Granufoam)   Additional dressings: n/a   Dressing change frequency: Start Date: 2023 and Change dressing three times a week: Monday, Wednesday, Friday. Next dressing change is due 2023.   Diet Regular   Discharge Condition: Stabilized   Discharge  Potential: Length of Stay LESS Than 30 Days   Discharge Summary: Enclosed   Free of Communicable Disease: Yes   Give 2-Step Mantoux: Yes, Unless Current or Contraindicated   Level of Care: Skilled   Patient Aware of Diagnosis: Yes   Patient may leave SNF supervised with medications   Rehab Potential: Good   Treatment - Occupational Therapy Eval and Treat   Treatment - Physical Therapy Eval and Treat   Treatment Options: Full Resuscitation   Vital Signs per facility routine   Weight per facility routine   Weigh on admission to skilled nursing facility   When should you be concerned?   At the skilled nursing facility let your health care providers know if you notice any changes in your condition.   Who can the receiving facility call with order questions?   If any questions arise within the first 24 hours after discharge contact our service at [phone].   Why were you at the hospital?   You were in the hospital for abdominal pain and bloating due to a burst appendix and infection.       Pending Studies     Lab results that may not be resulted at time of discharge: (From admission through now)   None       Total time spent on discharge coordination: 45 minutes. Patient was seen and examined today.    Trini James MD  Hospitalist, [facility]   [phone]           Again, thank you for allowing me to participate in the care of your patient.        Sincerely,        Abimael Rhodes MD

## 2023-05-26 NOTE — PATIENT INSTRUCTIONS
I have seen Delon Dasilva and as you know his chief complaint is his wound is healing and does he need the wound vac.   His wounds look great and is al most healed.  Good granulation tissue and is just below the skin level.   Do damp to dry dressing changes and follow up with me in a month and will see how it is healing.    May have to use silver nitrate on it if the tissue grows above the wound.   Has no complains of abdomen pain.   Patient has a good chance of getting a hernia.   But may exercise. No limits on  That.

## 2023-06-02 ENCOUNTER — HEALTH MAINTENANCE LETTER (OUTPATIENT)
Age: 58
End: 2023-06-02

## 2023-06-06 ENCOUNTER — MEDICAL CORRESPONDENCE (OUTPATIENT)
Dept: HEALTH INFORMATION MANAGEMENT | Facility: CLINIC | Age: 58
End: 2023-06-06
Payer: COMMERCIAL

## 2023-07-07 ENCOUNTER — OFFICE VISIT (OUTPATIENT)
Dept: SURGERY | Facility: CLINIC | Age: 58
End: 2023-07-07
Payer: COMMERCIAL

## 2023-07-07 VITALS
OXYGEN SATURATION: 100 % | HEIGHT: 70 IN | HEART RATE: 96 BPM | DIASTOLIC BLOOD PRESSURE: 98 MMHG | WEIGHT: 315 LBS | SYSTOLIC BLOOD PRESSURE: 170 MMHG | BODY MASS INDEX: 45.1 KG/M2

## 2023-07-07 DIAGNOSIS — L08.9 WOUND INFECTION: Primary | ICD-10-CM

## 2023-07-07 DIAGNOSIS — T14.8XXA WOUND INFECTION: Primary | ICD-10-CM

## 2023-07-07 PROCEDURE — 99214 OFFICE O/P EST MOD 30 MIN: CPT | Performed by: SURGERY

## 2023-07-07 NOTE — PROGRESS NOTES
"Patient is here to look at wound.  There is only one area in the lower 1/3 of the incison that seems to have a small layer of skin, but is a mm above the rest of the edge.   If this grows up more than make an appointment and we can do the silver nitrate treatment.   Otherwise just cover the areas that there may be some drainage to protect your clothing.   And place some antibiotic ointment on the area at night to help it heal and soften.     Time spent with patient and family with greater than 50% of the time in discussion was 20 minutes. This also included looking at films and looking over the chart and discussion with other physicians involved with the patient care.  BP (!) 170/98 (BP Location: Right arm, Patient Position: Sitting, Cuff Size: Adult Large)   Pulse 96   Ht 1.778 m (5' 10\")   Wt 149.2 kg (329 lb)   SpO2 100%   BMI 47.21 kg/m          Time spent with patient and family with greater than 50% of the time in discussion was 30 minutes. This also included looking at films and looking over the chart and discussion with other physicians involved with the patient care.    Abimael Ravi, Benjamin Alexandra MD - 02/07/2023   Formatting of this note might be different from the original.   For Patients: As a result of the 21st Century Cures Act, medical imaging exams and procedure reports are released immediately into your electronic medical record. You may view this report before your referring provider. If you have questions, please contact your health care provider.     EXAM: CT ABDOMEN PELVIS WO   LOCATION: [facility]   DATE/TIME: 2/7/2023 9:46 PM     INDICATION: Abdominal distention, loose stools, vomiting.   COMPARISON: None.   TECHNIQUE: CT scan of the abdomen and pelvis was performed without IV contrast. Multiplanar reformats were obtained. Dose reduction techniques were used.   CONTRAST: None.     FINDINGS:   LOWER CHEST: Bibasilar atelectasis. Elevation right hemidiaphragm. "     HEPATOBILIARY: Cholelithiasis. No bile duct dilatation. Liver unremarkable.     PANCREAS: Normal.     SPLEEN: Normal.     ADRENAL GLANDS: Normal.     KIDNEYS/BLADDER: Normal.     BOWEL: Gas and fluid-filled stomach. Multiple prominently gas and fluid dilated loops of proximal small bowel with transition point identified in the mid abdomen centrally (series 2, image [id number]) consistent with mechanical small bowel obstruction. Additionally, there is scattered free intraperitoneal air most prominent in the pelvis adjacent to the sigmoid colon with additional free air in the upper abdomen. The mid sigmoid colon appears mildly thick-walled with inflammatory changes and edema in the mesentery in the lower abdomen and pelvis. Small amount of free fluid in pelvis. Findings consistent with ruptured viscus likely sigmoid colon; however, given small bowel obstruction, a small bowel origin for free air is not excluded. Surgical consultation recommended.     LYMPH NODES: Normal.     VASCULATURE: Mild aortic calcification.     PELVIC ORGANS: Normal.     MUSCULOSKELETAL: Hypertrophic and degenerative changes thoracic and upper lumbar spine. Periumbilical hernia containing mesenteric fat and small amount of ascites.     IMPRESSION:   1.  Small amount of scattered free intraperitoneal air most prominent adjacent to the mid sigmoid colon with associated inflammatory changes and edema in the mesentery in the lower abdomen and pelvis as well as free fluid in pelvis. The sigmoid colon adjacent to the free air appears thick-walled with surrounding inflammatory changes.   2.  Additionally, there is a mechanical small bowel obstruction with transition point identified in the midabdomen.   3.  Overall etiology of the free air is indeterminate, although likely from the sigmoid colon; however, given presence of mechanical small bowel obstruction, a small wall origin not excluded. Surgical consultation recommended.   4.  Cholelithiasis.    5.  Bibasilar atelectasis.   6.  Periumbilical hernia.     Findings called to Dr. Prieto 2/7/2023 9:58 PM       Impression    1.  Exam was not tailored for evaluation of pulmonary embolism. There is no central pulmonary embolism.     2.  Low lung volumes with bibasilar platelike atelectasis. No acute infiltrate.     3.  Probable thin fibrin sheath within the right internal jugular and brachiocephalic veins status post central venous catheter removal.     4.  Appendectomy. Trace right lower quadrant free fluid and scattered areas of right lower quadrant mesenteric edema. No loculated abscess.     5.  Midline laparotomy wound with wound VAC in place. No associated fluid collection.     6.  Dilated small bowel loops, which taper to decompressed distal small bowel in the left lower quadrant. Given recent surgery, findings favor adynamic ileus. However, mechanical small bowel obstruction would have a similar pattern.     7.  Cholelithiasis with mild stranding along the neck of the gallbladder. This is nonspecific and may be related to cholecystitis or edematous state. Consider abdominal ultrasound and/or hepatobiliary scan.     8.  Hypervolemia, as evidenced by anasarca.  Narrative    For Patients: As a result of the 21st Century Cures Act, medical imaging exams and procedure reports are released immediately into your electronic medical record. You may view this report before your referring provider. If you have questions, please contact your health care provider.     EXAM: CT CHEST ABDOMEN PELVIS W   LOCATION: [facility]   DATE/TIME: 2/18/2023 4:43 PM     INDICATION: Suspect PE and also abdominal abscess.   COMPARISON: CTs of the abdomen and pelvis dated 2/13/2023 and 2/7/2023. Chest x-ray dated 02/14/2023.   TECHNIQUE: CT scan of the chest, abdomen, and pelvis was performed following injection of IV contrast. Multiplanar reformats were obtained. Dose reduction techniques were used.   CONTRAST: Omnipaque 350 149  ml.     FINDINGS:   LUNGS AND PLEURA: Low lung volumes. Bibasilar linear atelectasis. No consolidation or infiltrate. Few punctate calcified granulomas. No pleural effusion or pneumothorax.     MEDIASTINUM/AXILLAE: Right upper extremity PICC tip terminates in the right atrium. Thin nonocclusive filling defect in the right internal jugular vein extending into the proximal brachiocephalic vein, status post removal of previously seen right IJ central venous catheter (series 5, image 62). This likely represents a fibrin sheath.     Cardiac size is within normal limits. No pericardial effusion. Normal caliber thoracic aorta. Exam was not tailored for evaluation of pulmonary embolism. However, there is no central pulmonary embolism. No lymphadenopathy.     CORONARY ARTERY CALCIFICATION: Mild.     HEPATOBILIARY: 2.5 cm peripherally calcified stone in the neck of the gallbladder. There is mild fat stranding along the neck of the gallbladder (series 5, image 66). No pericholecystic fluid. No other duct dilatation. No worrisome liver lesions.     PANCREAS: Normal.     SPLEEN: Normal.     ADRENAL GLANDS: Normal.     KIDNEYS/BLADDER: Small left lower pole renal cortical cysts, which does not require follow-up. Otherwise normal.     BOWEL: Postoperative changes of appendectomy. Trace free fluid in the adjacent right lower quadrant. No loculated fluid collection. Scattered areas of mesenteric edema throughout the right lower quadrant. No pneumoperitoneum.     Dilated loops of small bowel, measuring up to 5.6 cm in diameter, with associated air-fluid levels. These taper to decompressed distal small bowel in the left lower quadrant. Small volume of fluid and gas in the colon. No pneumatosis or portal venous gas.     LYMPH NODES: Normal.     VASCULATURE: No abdominal aortic aneurysm.     PELVIC ORGANS: Normal.     MUSCULOSKELETAL: Mild anasarca. Midline laparotomy wound with wound VAC in place. No associated fluid collection. Mild  hypertrophic degenerative changes of the thoracolumbar spine.

## 2023-07-07 NOTE — LETTER
"    7/7/2023         RE: Delon Dasilva  [address]  [place] [address]        Dear Colleague,    Thank you for referring your patient, Delon Dasilva, to the [facility]. Please see a copy of my visit note below.    Patient is here to look at wound.  There is only one area in the lower 1/3 of the incison that seems to have a small layer of skin, but is a mm above the rest of the edge.   If this grows up more than make an appointment and we can do the silver nitrate treatment.   Otherwise just cover the areas that there may be some drainage to protect your clothing.   And place some antibiotic ointment on the area at night to help it heal and soften.     Time spent with patient and family with greater than 50% of the time in discussion was 20 minutes. This also included looking at films and looking over the chart and discussion with other physicians involved with the patient care.  BP (!) 170/98 (BP Location: Right arm, Patient Position: Sitting, Cuff Size: Adult Large)   Pulse 96   Ht 1.778 m (5' 10\")   Wt 149.2 kg (329 lb)   SpO2 100%   BMI 47.21 kg/m          Time spent with patient and family with greater than 50% of the time in discussion was 30 minutes. This also included looking at films and looking over the chart and discussion with other physicians involved with the patient care.    Abimael Ravi, Benjamin Alexandra MD - 02/07/2023   Formatting of this note might be different from the original.   For Patients: As a result of the 21st Century Cures Act, medical imaging exams and procedure reports are released immediately into your electronic medical record. You may view this report before your referring provider. If you have questions, please contact your health care provider.     EXAM: CT ABDOMEN PELVIS WO   LOCATION: [facility]   DATE/TIME: 2/7/2023 9:46 PM     INDICATION: Abdominal distention, loose stools, vomiting.   COMPARISON: None.   TECHNIQUE: CT " scan of the abdomen and pelvis was performed without IV contrast. Multiplanar reformats were obtained. Dose reduction techniques were used.   CONTRAST: None.     FINDINGS:   LOWER CHEST: Bibasilar atelectasis. Elevation right hemidiaphragm.     HEPATOBILIARY: Cholelithiasis. No bile duct dilatation. Liver unremarkable.     PANCREAS: Normal.     SPLEEN: Normal.     ADRENAL GLANDS: Normal.     KIDNEYS/BLADDER: Normal.     BOWEL: Gas and fluid-filled stomach. Multiple prominently gas and fluid dilated loops of proximal small bowel with transition point identified in the mid abdomen centrally (series 2, image [id number]) consistent with mechanical small bowel obstruction. Additionally, there is scattered free intraperitoneal air most prominent in the pelvis adjacent to the sigmoid colon with additional free air in the upper abdomen. The mid sigmoid colon appears mildly thick-walled with inflammatory changes and edema in the mesentery in the lower abdomen and pelvis. Small amount of free fluid in pelvis. Findings consistent with ruptured viscus likely sigmoid colon; however, given small bowel obstruction, a small bowel origin for free air is not excluded. Surgical consultation recommended.     LYMPH NODES: Normal.     VASCULATURE: Mild aortic calcification.     PELVIC ORGANS: Normal.     MUSCULOSKELETAL: Hypertrophic and degenerative changes thoracic and upper lumbar spine. Periumbilical hernia containing mesenteric fat and small amount of ascites.     IMPRESSION:   1.  Small amount of scattered free intraperitoneal air most prominent adjacent to the mid sigmoid colon with associated inflammatory changes and edema in the mesentery in the lower abdomen and pelvis as well as free fluid in pelvis. The sigmoid colon adjacent to the free air appears thick-walled with surrounding inflammatory changes.   2.  Additionally, there is a mechanical small bowel obstruction with transition point identified in the midabdomen.   3.   Overall etiology of the free air is indeterminate, although likely from the sigmoid colon; however, given presence of mechanical small bowel obstruction, a small wall origin not excluded. Surgical consultation recommended.   4.  Cholelithiasis.   5.  Bibasilar atelectasis.   6.  Periumbilical hernia.     Findings called to Dr. Prieto 2/7/2023 9:58 PM       Impression    1.  Exam was not tailored for evaluation of pulmonary embolism. There is no central pulmonary embolism.     2.  Low lung volumes with bibasilar platelike atelectasis. No acute infiltrate.     3.  Probable thin fibrin sheath within the right internal jugular and brachiocephalic veins status post central venous catheter removal.     4.  Appendectomy. Trace right lower quadrant free fluid and scattered areas of right lower quadrant mesenteric edema. No loculated abscess.     5.  Midline laparotomy wound with wound VAC in place. No associated fluid collection.     6.  Dilated small bowel loops, which taper to decompressed distal small bowel in the left lower quadrant. Given recent surgery, findings favor adynamic ileus. However, mechanical small bowel obstruction would have a similar pattern.     7.  Cholelithiasis with mild stranding along the neck of the gallbladder. This is nonspecific and may be related to cholecystitis or edematous state. Consider abdominal ultrasound and/or hepatobiliary scan.     8.  Hypervolemia, as evidenced by anasarca.  Narrative    For Patients: As a result of the 21st Century Cures Act, medical imaging exams and procedure reports are released immediately into your electronic medical record. You may view this report before your referring provider. If you have questions, please contact your health care provider.     EXAM: CT CHEST ABDOMEN PELVIS W   LOCATION: [facility]   DATE/TIME: 2/18/2023 4:43 PM     INDICATION: Suspect PE and also abdominal abscess.   COMPARISON: CTs of the abdomen and pelvis dated 2/13/2023 and  2/7/2023. Chest x-ray dated 02/14/2023.   TECHNIQUE: CT scan of the chest, abdomen, and pelvis was performed following injection of IV contrast. Multiplanar reformats were obtained. Dose reduction techniques were used.   CONTRAST: Omnipaque 350 149 ml.     FINDINGS:   LUNGS AND PLEURA: Low lung volumes. Bibasilar linear atelectasis. No consolidation or infiltrate. Few punctate calcified granulomas. No pleural effusion or pneumothorax.     MEDIASTINUM/AXILLAE: Right upper extremity PICC tip terminates in the right atrium. Thin nonocclusive filling defect in the right internal jugular vein extending into the proximal brachiocephalic vein, status post removal of previously seen right IJ central venous catheter (series 5, image 62). This likely represents a fibrin sheath.     Cardiac size is within normal limits. No pericardial effusion. Normal caliber thoracic aorta. Exam was not tailored for evaluation of pulmonary embolism. However, there is no central pulmonary embolism. No lymphadenopathy.     CORONARY ARTERY CALCIFICATION: Mild.     HEPATOBILIARY: 2.5 cm peripherally calcified stone in the neck of the gallbladder. There is mild fat stranding along the neck of the gallbladder (series 5, image 66). No pericholecystic fluid. No other duct dilatation. No worrisome liver lesions.     PANCREAS: Normal.     SPLEEN: Normal.     ADRENAL GLANDS: Normal.     KIDNEYS/BLADDER: Small left lower pole renal cortical cysts, which does not require follow-up. Otherwise normal.     BOWEL: Postoperative changes of appendectomy. Trace free fluid in the adjacent right lower quadrant. No loculated fluid collection. Scattered areas of mesenteric edema throughout the right lower quadrant. No pneumoperitoneum.     Dilated loops of small bowel, measuring up to 5.6 cm in diameter, with associated air-fluid levels. These taper to decompressed distal small bowel in the left lower quadrant. Small volume of fluid and gas in the colon. No  pneumatosis or portal venous gas.     LYMPH NODES: Normal.     VASCULATURE: No abdominal aortic aneurysm.     PELVIC ORGANS: Normal.     MUSCULOSKELETAL: Mild anasarca. Midline laparotomy wound with wound VAC in place. No associated fluid collection. Mild hypertrophic degenerative changes of the thoracolumbar spine.              Again, thank you for allowing me to participate in the care of your patient.        Sincerely,        Abimael Rhodes MD

## 2023-07-07 NOTE — PATIENT INSTRUCTIONS
"Patient is here to look at wound.  There is only one area in the lower 1/3 of the incison that seems to have a small layer of skin, but is a mm above the rest of the edge.   If this grows up more than make an appointment and we can do the silver nitrate treatment.   Otherwise just cover the areas that there may be some drainage to protect your clothing.   And place some antibiotic ointment on the area at night to help it heal and soften.     Time spent with patient and family with greater than 50% of the time in discussion was 20 minutes. This also included looking at films and looking over the chart and discussion with other physicians involved with the patient care.  BP (!) 170/98 (BP Location: Right arm, Patient Position: Sitting, Cuff Size: Adult Large)   Pulse 96   Ht 1.778 m (5' 10\")   Wt 149.2 kg (329 lb)   SpO2 100%   BMI 47.21 kg/m          Abimael Rhodes MD  "

## 2023-07-10 ENCOUNTER — TELEPHONE (OUTPATIENT)
Dept: FAMILY MEDICINE | Facility: CLINIC | Age: 58
End: 2023-07-10
Payer: COMMERCIAL

## 2023-07-10 NOTE — TELEPHONE ENCOUNTER
"----- Message from Barbara Canchola MD sent at 7/8/2023 10:33 PM CDT -----  Regarding: FW: blood pressure.  Can we have this patient scheduled for a BP follow-up     Thanks so much     [facility] [place]  ----- Message -----  From: Abimael Rhodes MD  Sent: 7/7/2023  11:50 AM CDT  To: Barbara Canchola MD  Subject: blood pressure.                                  Please see blood pressure   Thanks  Abimael Rhodes MD      Patient is here to look at wound.  There is only one area in the lower 1/3 of the incison that seems to have a small layer of skin, but is a mm above the rest of the edge.   If this grows up more than make an appointment and we can do the silver nitrate treatment.   Otherwise just cover the areas that there may be some drainage to protect your clothing.   And place some antibiotic ointment on the area at night to help it heal and soften.     Time spent with patient and family with greater than 50% of the time in discussion was 20 minutes. This also included looking at films and looking over the chart and discussion with other physicians involved with the patient care.  BP (!) 170/98 (BP Location: Right arm, Patient Position: Sitting, Cuff Size: Adult Large)   Pulse 96   Ht 1.778 m (5' 10\")   Wt 149.2 kg (329 lb)   SpO2 100%   BMI 47.21 kg/m          Abimael Rhodse MD      "

## 2023-07-12 ENCOUNTER — OFFICE VISIT (OUTPATIENT)
Dept: FAMILY MEDICINE | Facility: CLINIC | Age: 58
End: 2023-07-12
Payer: COMMERCIAL

## 2023-07-12 ENCOUNTER — MYC MEDICAL ADVICE (OUTPATIENT)
Dept: FAMILY MEDICINE | Facility: CLINIC | Age: 58
End: 2023-07-12

## 2023-07-12 VITALS
OXYGEN SATURATION: 96 % | DIASTOLIC BLOOD PRESSURE: 110 MMHG | SYSTOLIC BLOOD PRESSURE: 160 MMHG | WEIGHT: 315 LBS | BODY MASS INDEX: 45.1 KG/M2 | HEIGHT: 70 IN | TEMPERATURE: 98.7 F | HEART RATE: 90 BPM

## 2023-07-12 DIAGNOSIS — E66.01 CLASS 3 SEVERE OBESITY DUE TO EXCESS CALORIES WITH SERIOUS COMORBIDITY AND BODY MASS INDEX (BMI) OF 45.0 TO 49.9 IN ADULT (H): Primary | ICD-10-CM

## 2023-07-12 DIAGNOSIS — E66.813 CLASS 3 SEVERE OBESITY DUE TO EXCESS CALORIES WITH SERIOUS COMORBIDITY AND BODY MASS INDEX (BMI) OF 45.0 TO 49.9 IN ADULT (H): Primary | ICD-10-CM

## 2023-07-12 DIAGNOSIS — I10 ESSENTIAL HYPERTENSION WITH GOAL BLOOD PRESSURE LESS THAN 140/90: ICD-10-CM

## 2023-07-12 DIAGNOSIS — E66.01 CLASS 3 SEVERE OBESITY DUE TO EXCESS CALORIES WITH SERIOUS COMORBIDITY AND BODY MASS INDEX (BMI) OF 45.0 TO 49.9 IN ADULT (H): ICD-10-CM

## 2023-07-12 DIAGNOSIS — E66.813 CLASS 3 SEVERE OBESITY DUE TO EXCESS CALORIES WITH SERIOUS COMORBIDITY AND BODY MASS INDEX (BMI) OF 45.0 TO 49.9 IN ADULT (H): ICD-10-CM

## 2023-07-12 PROCEDURE — 36415 COLL VENOUS BLD VENIPUNCTURE: CPT | Performed by: STUDENT IN AN ORGANIZED HEALTH CARE EDUCATION/TRAINING PROGRAM

## 2023-07-12 PROCEDURE — 80053 COMPREHEN METABOLIC PANEL: CPT | Performed by: STUDENT IN AN ORGANIZED HEALTH CARE EDUCATION/TRAINING PROGRAM

## 2023-07-12 PROCEDURE — 99215 OFFICE O/P EST HI 40 MIN: CPT | Performed by: STUDENT IN AN ORGANIZED HEALTH CARE EDUCATION/TRAINING PROGRAM

## 2023-07-12 RX ORDER — LISINOPRIL 20 MG/1
20 TABLET ORAL DAILY
Qty: 90 TABLET | Refills: 0 | Status: SHIPPED | OUTPATIENT
Start: 2023-07-12 | End: 2023-08-02

## 2023-07-12 NOTE — PROGRESS NOTES
Assessment & Plan     (E66.01,  Z68.42) Class 3 severe obesity due to excess calories with serious comorbidity and body mass index (BMI) of 45.0 to 49.9 in adult (H)  (primary encounter diagnosis)  Comment: Chronic, uncontrolled.Obesity, weight would improve new diagnosis of hypertension. Discussed with patient the risks of being obese as it relates to chronic conditions and worsening outcomes. Pt's current BMI is 49.13 which places patient at a higher risk for worsening co-morbidities.  During the visit, we discussed options of weight management via medication and options included but not limited to Wegovy, Phentermine, Monjuarno, Ozempic, Topamax.   Risk and benefits were discussed regarding starting Wegovy and patient in agreement to start. Will start Wegovy at 0.25 mg subq weekly and patient scheduled to have 1 month follow-up for re-assessment prior to medication change or titration. Pt to weigh in during every clinic visit and a review of progress or lack thereof in relation to action plan/goals will be discussed.  If Wegovy unavailable secondary to cost consideration to start phentermine or Topamax as a tentative option.  Plan: REVIEW OF HEALTH MAINTENANCE PROTOCOL ORDERS,         Semaglutide-Weight Management (WEGOVY) 0.25         MG/0.5ML pen        Return to clinic in 1 month    (I10) Essential hypertension with goal blood pressure less than 140/90  Comment: Chronic, uncontrolled.  Spoke with patient's home health nurse, telephone (Yelena) and at home blood pressure readings have ranged between 140s to 160s systolics and 90s diastolics.  In office blood pressure noted to be 160/110 and per chart review previous blood pressures have remained elevated.  Discussion had with patient regarding starting blood pressure medication and patient in agreement to start lisinopril 20 mg.  Will obtain CMP as baseline.  Patient and Yelena to continue with at home blood pressure monitoring and follow-up in 1  "month.  Plan: lisinopril (ZESTRIL) 20 MG tablet,         Comprehensive metabolic panel (BMP + Alb, Alk         Phos, ALT, AST, Total. Bili, TP)          I personally spent a total of 45 minutes on the day of the visit, excluding procedures. Please see note for details about time spent which includes:        reviewing records    face to face time with the patient    timely documentation of the encounter    ordering medications/tests    care coordination.            45 minutes spent by me on the date of the encounter doing chart review, history and exam, documentation and further activities per the note       BMI:   Estimated body mass index is 49.13 kg/m  as calculated from the following:    Height as of this encounter: 1.778 m (5' 10\").    Weight as of this encounter: 155.3 kg (342 lb 6.4 oz).   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 Home nurse stated that BP running high constantly when checking. Not sure of to top number for BP but the lower number is around 80-90)        7/12/2023    10:41 AM   Additional Questions   Roomed by An V.         7/12/2023    10:41 AM   Patient Reported Additional Medications   Patient reports taking the following new medications none     History of Present Illness       Reason for visit:  High blood pressure  Symptom onset:  3-4 weeks ago  Symptoms include:  High reading  Symptom intensity:  Mild  Symptom progression:  Staying the same  Had these symptoms before:  No    He eats 0-1 servings of fruits and vegetables daily.He consumes 1 sweetened beverage(s) daily.He exercises with enough effort to increase his heart rate 9 or less minutes per day.  He exercises with enough effort to increase his heart rate 3 or " "less days per week.   He is taking medications regularly.     Patient states that he has been noticing his blood pressure rise over the last several months. He reports his home health nurse has mentioned that his blood pressure has been slightly elevated. He reports at his last visit with his surgeon     Yelena [facility] : 140-160's  systolic's and 90's diastolic's  Patient denies any headache, chest pain, shortness of breath.    Weight loss  Patient states that when he was younger he was at  and also was an avid smoker.  Patient reports being very lean in his early ages around the 20s however since discontinuing smoking he noticed that he continued to gain weight.  Patient states that has been difficult for him to lose weight particularly now status post surgery.  Patient states that much of his lack of energy and stamina he attributes to his increased weight and is interested in weight loss modalities at this time.            Review of Systems   CONSTITUTIONAL:POSITIVE  for weight gain  ENT/MOUTH: NEGATIVE for ear, mouth and throat problems  RESP: NEGATIVE for significant cough or SOB  CV: NEGATIVE for chest pain, palpitations or peripheral edema      Objective    BP (!) 160/110   Pulse 90   Temp 98.7  F (37.1  C) (Temporal)   Ht 1.778 m (5' 10\")   Wt (!) 155.3 kg (342 lb 6.4 oz)   SpO2 96%   BMI 49.13 kg/m    Body mass index is 49.13 kg/m .  Physical Exam   GENERAL: healthy, alert and no distress  EYES: Eyes grossly normal to inspection, PERRL and conjunctivae and sclerae normal  Neck: No visible JVD or lymphadenopathy   RESP: symmetrical rise in chest   CV: No peripheral edema notable   MS: no gross musculoskeletal defects noted  SKIN: Abdominal incision and healing with no drainage present.  PSYCH: mentation appears normal, affect normal/bright      No results found for any visits on 07/12/23.                "

## 2023-07-12 NOTE — PATIENT INSTRUCTIONS
Let Dr. Canchola know when you get the Wegovy medication and which day you started  Take Lisinopril in the morning and have Yelena take BP records  GIVE YOURSELF SOME JEREMY!!!!

## 2023-07-13 ENCOUNTER — TELEPHONE (OUTPATIENT)
Dept: FAMILY MEDICINE | Facility: CLINIC | Age: 58
End: 2023-07-13
Payer: COMMERCIAL

## 2023-07-13 LAB
ALBUMIN SERPL BCG-MCNC: 4.4 G/DL (ref 3.5–5.2)
ALP SERPL-CCNC: 124 U/L (ref 40–129)
ALT SERPL W P-5'-P-CCNC: 16 U/L (ref 0–70)
ANION GAP SERPL CALCULATED.3IONS-SCNC: 13 MMOL/L (ref 7–15)
AST SERPL W P-5'-P-CCNC: 23 U/L (ref 0–45)
BILIRUB SERPL-MCNC: 0.8 MG/DL
BUN SERPL-MCNC: 16 MG/DL (ref 6–20)
CALCIUM SERPL-MCNC: 9.2 MG/DL (ref 8.6–10)
CHLORIDE SERPL-SCNC: 107 MMOL/L (ref 98–107)
CREAT SERPL-MCNC: 0.97 MG/DL (ref 0.67–1.17)
DEPRECATED HCO3 PLAS-SCNC: 20 MMOL/L (ref 22–29)
GFR SERPL CREATININE-BSD FRML MDRD: >90 ML/MIN/1.73M2
GLUCOSE SERPL-MCNC: 99 MG/DL (ref 70–99)
POTASSIUM SERPL-SCNC: 4.2 MMOL/L (ref 3.4–5.3)
PROT SERPL-MCNC: 7.8 G/DL (ref 6.4–8.3)
SODIUM SERPL-SCNC: 140 MMOL/L (ref 136–145)

## 2023-07-18 NOTE — TELEPHONE ENCOUNTER
Central Prior Authorization Team   Phone: [phone]    PA Initiation    Medication: WEGOVY 0.25 MG/0.5ML SC SOAJ  Insurance Company: [employer] ([facility]) - Phone [phone] Fax [phone]  Pharmacy Filling the Rx: [facility] - [address]  Filling Pharmacy Phone: [phone]  Filling Pharmacy Fax:    Start Date: 7/18/2023

## 2023-07-18 NOTE — TELEPHONE ENCOUNTER
Prior Authorization Approval    Medication: WEGOVY 0.25 MG/0.5ML SC SOAJ  Authorization Effective Date: 7/18/2023  Authorization Expiration Date: 1/18/2024   Insurance Company: [employer] ([facility]) - Phone [phone] Fax [phone]  Which Pharmacy is filling the prescription: [facility] [place], [place] - [address]  Pharmacy Notified: Yes  Patient Notified: Yes (pharmacy will notify patient when ready)

## 2023-08-02 ENCOUNTER — TELEPHONE (OUTPATIENT)
Dept: FAMILY MEDICINE | Facility: CLINIC | Age: 58
End: 2023-08-02
Payer: COMMERCIAL

## 2023-08-02 DIAGNOSIS — I10 ESSENTIAL HYPERTENSION WITH GOAL BLOOD PRESSURE LESS THAN 140/90: ICD-10-CM

## 2023-08-02 RX ORDER — LISINOPRIL 40 MG/1
40 TABLET ORAL DAILY
Qty: 90 TABLET | Refills: 0 | Status: SHIPPED | OUTPATIENT
Start: 2023-08-02 | End: 2023-10-18

## 2023-08-02 NOTE — TELEPHONE ENCOUNTER
Received call from patient's home health care nurse, Yelena. She was told to call with updated BP's in 1 month after patient started lisinopril (ZESTRIL) 20 MG tablet. He has been taking this regularly, as prescribed.    Weekly blood pressure readings:  159/90  146/94  140/89  144/91    Yelena states that if adjusting medications, can call her back with instructions and she can communicate with the patient.    LINETTE Garcia RN  [facility]

## 2023-08-02 NOTE — TELEPHONE ENCOUNTER
Pt should increase dose of Lisinopril from 20 mg to 40 mg daily     Will send new medication to pharmacy     Continue to do BP checks and provide readings    MG

## 2023-08-11 ENCOUNTER — TELEPHONE (OUTPATIENT)
Dept: FAMILY MEDICINE | Facility: CLINIC | Age: 58
End: 2023-08-11
Payer: COMMERCIAL

## 2023-08-11 DIAGNOSIS — E66.01 CLASS 3 SEVERE OBESITY DUE TO EXCESS CALORIES WITH SERIOUS COMORBIDITY AND BODY MASS INDEX (BMI) OF 45.0 TO 49.9 IN ADULT (H): Primary | ICD-10-CM

## 2023-08-11 DIAGNOSIS — Z53.9 DIAGNOSIS NOT YET DEFINED: Primary | ICD-10-CM

## 2023-08-11 DIAGNOSIS — E66.813 CLASS 3 SEVERE OBESITY DUE TO EXCESS CALORIES WITH SERIOUS COMORBIDITY AND BODY MASS INDEX (BMI) OF 45.0 TO 49.9 IN ADULT (H): Primary | ICD-10-CM

## 2023-08-11 PROCEDURE — G0179 MD RECERTIFICATION HHA PT: HCPCS | Performed by: STUDENT IN AN ORGANIZED HEALTH CARE EDUCATION/TRAINING PROGRAM

## 2023-08-11 NOTE — TELEPHONE ENCOUNTER
Reason for Call:  Form, our goal is to have forms completed with 72 hours, however, some forms may require a visit or additional information.    Type of letter, form or note:  Home Health Certification    Who is the form from?: Home care    Where did the form come from: form was faxed in    What clinic location was the form placed at?: [facility]    Where the form was placed: Given to physician    What number is listed as a contact on the form?: [phone]       Call taken on 8/11/2023 at 8:29 AM by Connie Arana

## 2023-08-11 NOTE — TELEPHONE ENCOUNTER
[facility] signed by Dr. Canchola and faxed back to [phone].  Connie Arana,

## 2023-08-11 NOTE — TELEPHONE ENCOUNTER
Patient is due for the next dose of wegovy 0.5mg.  Thank you   Christina Sotelo. Pharmacy Technician   [facility] [place]

## 2023-09-16 ENCOUNTER — MYC REFILL (OUTPATIENT)
Dept: FAMILY MEDICINE | Facility: CLINIC | Age: 58
End: 2023-09-16
Payer: COMMERCIAL

## 2023-09-16 DIAGNOSIS — E66.813 CLASS 3 SEVERE OBESITY DUE TO EXCESS CALORIES WITH SERIOUS COMORBIDITY AND BODY MASS INDEX (BMI) OF 45.0 TO 49.9 IN ADULT (H): ICD-10-CM

## 2023-09-16 DIAGNOSIS — E66.01 CLASS 3 SEVERE OBESITY DUE TO EXCESS CALORIES WITH SERIOUS COMORBIDITY AND BODY MASS INDEX (BMI) OF 45.0 TO 49.9 IN ADULT (H): ICD-10-CM

## 2023-09-18 DIAGNOSIS — E66.813 CLASS 3 SEVERE OBESITY DUE TO EXCESS CALORIES WITH SERIOUS COMORBIDITY AND BODY MASS INDEX (BMI) OF 45.0 TO 49.9 IN ADULT (H): Primary | ICD-10-CM

## 2023-09-18 DIAGNOSIS — E66.01 CLASS 3 SEVERE OBESITY DUE TO EXCESS CALORIES WITH SERIOUS COMORBIDITY AND BODY MASS INDEX (BMI) OF 45.0 TO 49.9 IN ADULT (H): Primary | ICD-10-CM

## 2023-10-12 ENCOUNTER — MYC REFILL (OUTPATIENT)
Dept: FAMILY MEDICINE | Facility: CLINIC | Age: 58
End: 2023-10-12
Payer: COMMERCIAL

## 2023-10-12 DIAGNOSIS — I10 ESSENTIAL HYPERTENSION WITH GOAL BLOOD PRESSURE LESS THAN 140/90: ICD-10-CM

## 2023-10-12 DIAGNOSIS — E66.813 CLASS 3 SEVERE OBESITY DUE TO EXCESS CALORIES WITH SERIOUS COMORBIDITY AND BODY MASS INDEX (BMI) OF 45.0 TO 49.9 IN ADULT (H): ICD-10-CM

## 2023-10-12 DIAGNOSIS — E66.813 CLASS 3 SEVERE OBESITY DUE TO EXCESS CALORIES WITH SERIOUS COMORBIDITY AND BODY MASS INDEX (BMI) OF 45.0 TO 49.9 IN ADULT (H): Primary | ICD-10-CM

## 2023-10-12 DIAGNOSIS — E66.01 CLASS 3 SEVERE OBESITY DUE TO EXCESS CALORIES WITH SERIOUS COMORBIDITY AND BODY MASS INDEX (BMI) OF 45.0 TO 49.9 IN ADULT (H): Primary | ICD-10-CM

## 2023-10-12 DIAGNOSIS — E66.01 CLASS 3 SEVERE OBESITY DUE TO EXCESS CALORIES WITH SERIOUS COMORBIDITY AND BODY MASS INDEX (BMI) OF 45.0 TO 49.9 IN ADULT (H): ICD-10-CM

## 2023-10-18 ENCOUNTER — MYC REFILL (OUTPATIENT)
Dept: FAMILY MEDICINE | Facility: CLINIC | Age: 58
End: 2023-10-18
Payer: COMMERCIAL

## 2023-10-18 DIAGNOSIS — I10 ESSENTIAL HYPERTENSION WITH GOAL BLOOD PRESSURE LESS THAN 140/90: ICD-10-CM

## 2023-10-18 RX ORDER — LISINOPRIL 40 MG/1
40 TABLET ORAL DAILY
Qty: 90 TABLET | Refills: 0 | Status: SHIPPED | OUTPATIENT
Start: 2023-10-18 | End: 2024-01-12

## 2023-11-12 ENCOUNTER — MYC REFILL (OUTPATIENT)
Dept: FAMILY MEDICINE | Facility: CLINIC | Age: 58
End: 2023-11-12
Payer: COMMERCIAL

## 2023-11-12 DIAGNOSIS — E66.01 CLASS 3 SEVERE OBESITY DUE TO EXCESS CALORIES WITH SERIOUS COMORBIDITY AND BODY MASS INDEX (BMI) OF 45.0 TO 49.9 IN ADULT (H): ICD-10-CM

## 2023-11-12 DIAGNOSIS — E66.813 CLASS 3 SEVERE OBESITY DUE TO EXCESS CALORIES WITH SERIOUS COMORBIDITY AND BODY MASS INDEX (BMI) OF 45.0 TO 49.9 IN ADULT (H): ICD-10-CM

## 2023-11-12 DIAGNOSIS — I10 ESSENTIAL HYPERTENSION WITH GOAL BLOOD PRESSURE LESS THAN 140/90: ICD-10-CM

## 2023-12-08 ENCOUNTER — MYC REFILL (OUTPATIENT)
Dept: FAMILY MEDICINE | Facility: CLINIC | Age: 58
End: 2023-12-08
Payer: COMMERCIAL

## 2023-12-08 DIAGNOSIS — I10 ESSENTIAL HYPERTENSION WITH GOAL BLOOD PRESSURE LESS THAN 140/90: ICD-10-CM

## 2023-12-08 DIAGNOSIS — E66.01 CLASS 3 SEVERE OBESITY DUE TO EXCESS CALORIES WITH SERIOUS COMORBIDITY AND BODY MASS INDEX (BMI) OF 45.0 TO 49.9 IN ADULT (H): ICD-10-CM

## 2023-12-08 DIAGNOSIS — E66.813 CLASS 3 SEVERE OBESITY DUE TO EXCESS CALORIES WITH SERIOUS COMORBIDITY AND BODY MASS INDEX (BMI) OF 45.0 TO 49.9 IN ADULT (H): ICD-10-CM

## 2023-12-14 ENCOUNTER — MYC REFILL (OUTPATIENT)
Dept: FAMILY MEDICINE | Facility: CLINIC | Age: 58
End: 2023-12-14
Payer: COMMERCIAL

## 2023-12-14 DIAGNOSIS — I10 ESSENTIAL HYPERTENSION WITH GOAL BLOOD PRESSURE LESS THAN 140/90: ICD-10-CM

## 2023-12-14 DIAGNOSIS — E66.01 CLASS 3 SEVERE OBESITY DUE TO EXCESS CALORIES WITH SERIOUS COMORBIDITY AND BODY MASS INDEX (BMI) OF 45.0 TO 49.9 IN ADULT (H): ICD-10-CM

## 2023-12-14 DIAGNOSIS — E66.813 CLASS 3 SEVERE OBESITY DUE TO EXCESS CALORIES WITH SERIOUS COMORBIDITY AND BODY MASS INDEX (BMI) OF 45.0 TO 49.9 IN ADULT (H): ICD-10-CM

## 2024-01-12 ENCOUNTER — MYC REFILL (OUTPATIENT)
Dept: FAMILY MEDICINE | Facility: CLINIC | Age: 59
End: 2024-01-12
Payer: COMMERCIAL

## 2024-01-12 DIAGNOSIS — I10 ESSENTIAL HYPERTENSION WITH GOAL BLOOD PRESSURE LESS THAN 140/90: ICD-10-CM

## 2024-01-12 RX ORDER — LISINOPRIL 40 MG/1
40 TABLET ORAL DAILY
Qty: 30 TABLET | Refills: 0 | Status: SHIPPED | OUTPATIENT
Start: 2024-01-12 | End: 2024-02-11

## 2024-01-12 NOTE — TELEPHONE ENCOUNTER
Called patient and informed of message and he will call and make appointment on his own. Nothing else needed.       Makenzie SALEH CMA ([facility])

## 2024-01-22 ENCOUNTER — MYC REFILL (OUTPATIENT)
Dept: FAMILY MEDICINE | Facility: CLINIC | Age: 59
End: 2024-01-22
Payer: COMMERCIAL

## 2024-01-22 DIAGNOSIS — E66.01 CLASS 3 SEVERE OBESITY DUE TO EXCESS CALORIES WITH SERIOUS COMORBIDITY AND BODY MASS INDEX (BMI) OF 45.0 TO 49.9 IN ADULT (H): ICD-10-CM

## 2024-01-22 DIAGNOSIS — I10 ESSENTIAL HYPERTENSION WITH GOAL BLOOD PRESSURE LESS THAN 140/90: ICD-10-CM

## 2024-01-22 DIAGNOSIS — E66.813 CLASS 3 SEVERE OBESITY DUE TO EXCESS CALORIES WITH SERIOUS COMORBIDITY AND BODY MASS INDEX (BMI) OF 45.0 TO 49.9 IN ADULT (H): ICD-10-CM

## 2024-01-30 ENCOUNTER — TELEPHONE (OUTPATIENT)
Dept: FAMILY MEDICINE | Facility: CLINIC | Age: 59
End: 2024-01-30
Payer: COMMERCIAL

## 2024-02-07 ENCOUNTER — MYC REFILL (OUTPATIENT)
Dept: FAMILY MEDICINE | Facility: CLINIC | Age: 59
End: 2024-02-07
Payer: COMMERCIAL

## 2024-02-07 DIAGNOSIS — E66.01 CLASS 3 SEVERE OBESITY DUE TO EXCESS CALORIES WITH SERIOUS COMORBIDITY AND BODY MASS INDEX (BMI) OF 45.0 TO 49.9 IN ADULT (H): ICD-10-CM

## 2024-02-07 DIAGNOSIS — E66.813 CLASS 3 SEVERE OBESITY DUE TO EXCESS CALORIES WITH SERIOUS COMORBIDITY AND BODY MASS INDEX (BMI) OF 45.0 TO 49.9 IN ADULT (H): ICD-10-CM

## 2024-02-07 DIAGNOSIS — I10 ESSENTIAL HYPERTENSION WITH GOAL BLOOD PRESSURE LESS THAN 140/90: ICD-10-CM

## 2024-02-08 NOTE — TELEPHONE ENCOUNTER
Prior Authorization Retail Medication Request    Medication/Dose: Semaglutide-Weight Management (WEGOVY) 2.4 MG/0.75ML pen   Diagnosis and ICD code (if different than what is on RX):   Class 3 severe obesity due to excess calories with serious comorbidity and body mass index (BMI) of 45.0 to 49.9 in adult (H) [E66.01, Z68.42]      Essential hypertension with goal blood pressure less than 140/90 [I10]         New/renewal/insurance change PA/secondary ins. PA:  Previously Tried and Failed:    Rationale:      Insurance   Primary: [facility]   Insurance ID:  [id number]    Secondary (if applicable):  Insurance ID:      Pharmacy Information (if different than what is on RX)  Name:  [facility]   Phone:  [phone]  Fax:[phone]

## 2024-02-09 NOTE — TELEPHONE ENCOUNTER
PA Initiation    Medication: WEGOVY 2.4 MG/0.75ML SC SOAJ  Insurance Company: [employer] ([facility]) - Phone [phone] Fax [phone]  Pharmacy Filling the Rx: [facility] - [address]  Filling Pharmacy Phone: [phone]  Filling Pharmacy Fax:    Start Date: 2/8/2024

## 2024-02-09 NOTE — TELEPHONE ENCOUNTER
Called patient stated he took his weight yesterday and it was 330lbs       Makenzie SALEH CMA ([facility])

## 2024-02-11 ENCOUNTER — MYC REFILL (OUTPATIENT)
Dept: FAMILY MEDICINE | Facility: CLINIC | Age: 59
End: 2024-02-11
Payer: COMMERCIAL

## 2024-02-11 DIAGNOSIS — I10 ESSENTIAL HYPERTENSION WITH GOAL BLOOD PRESSURE LESS THAN 140/90: ICD-10-CM

## 2024-02-12 RX ORDER — LISINOPRIL 40 MG/1
40 TABLET ORAL DAILY
Qty: 30 TABLET | Refills: 0 | Status: SHIPPED | OUTPATIENT
Start: 2024-02-12 | End: 2024-03-18

## 2024-02-13 NOTE — TELEPHONE ENCOUNTER
Prior Authorization Not Needed per Insurance    Medication: wegovy  Insurance Company: [employer] ([facility]) - Phone [phone] Fax [phone]  Expected CoPay:      Pharmacy Filling the Rx: [facility] [place], [place] - [address]  Pharmacy Notified:  Yes  Patient Notified:  **Instructed pharmacy to notify patient when script is ready to /ship.**

## 2024-02-13 NOTE — TELEPHONE ENCOUNTER
Central Prior Authorization Team   Phone: [phone]    PA Initiation    Medication: wegovy  Insurance Company: [employer] ([facility]) - Phone [phone] Fax [phone]  Pharmacy [facility]illing the Rx: [facility] - [address] [facility] [facility]  Filling Pharmacy Phone: [phone]  Filling Pharmacy Fax:    Start Date: 2/13/2024

## 2024-02-13 NOTE — TELEPHONE ENCOUNTER
Prior Authorization Approval    Medication: WEGOVY 2.4 MG/0.75ML SC SOAJ  Authorization Effective Date: 2/9/2024  Authorization Expiration Date: 2/9/2025  Approved Dose/Quantity: 3/28  Reference #: [id number]   Insurance Company: [employer] ([facility]) - Phone [phone] Fax [phone]  Expected CoPay: $    CoPay Card Available:      Financial Assistance Needed:   Which Pharmacy is filling the prescription: [facility] [place], [place] - [address] [facility]  Pharmacy Notified: Yes  Patient Notified: Yes

## 2024-03-12 ENCOUNTER — MYC REFILL (OUTPATIENT)
Dept: FAMILY MEDICINE | Facility: CLINIC | Age: 59
End: 2024-03-12
Payer: COMMERCIAL

## 2024-03-12 DIAGNOSIS — E66.01 CLASS 3 SEVERE OBESITY DUE TO EXCESS CALORIES WITH SERIOUS COMORBIDITY AND BODY MASS INDEX (BMI) OF 45.0 TO 49.9 IN ADULT (H): ICD-10-CM

## 2024-03-12 DIAGNOSIS — I10 ESSENTIAL HYPERTENSION WITH GOAL BLOOD PRESSURE LESS THAN 140/90: ICD-10-CM

## 2024-03-12 DIAGNOSIS — E66.813 CLASS 3 SEVERE OBESITY DUE TO EXCESS CALORIES WITH SERIOUS COMORBIDITY AND BODY MASS INDEX (BMI) OF 45.0 TO 49.9 IN ADULT (H): ICD-10-CM

## 2024-03-18 ENCOUNTER — MYC REFILL (OUTPATIENT)
Dept: FAMILY MEDICINE | Facility: CLINIC | Age: 59
End: 2024-03-18
Payer: COMMERCIAL

## 2024-03-18 DIAGNOSIS — I10 ESSENTIAL HYPERTENSION WITH GOAL BLOOD PRESSURE LESS THAN 140/90: ICD-10-CM

## 2024-03-18 RX ORDER — LISINOPRIL 40 MG/1
40 TABLET ORAL DAILY
Qty: 15 TABLET | Refills: 0 | Status: SHIPPED | OUTPATIENT
Start: 2024-03-18 | End: 2024-04-11

## 2024-04-11 ENCOUNTER — TELEPHONE (OUTPATIENT)
Dept: FAMILY MEDICINE | Facility: CLINIC | Age: 59
End: 2024-04-11

## 2024-04-11 ENCOUNTER — OFFICE VISIT (OUTPATIENT)
Dept: FAMILY MEDICINE | Facility: CLINIC | Age: 59
End: 2024-04-11
Payer: COMMERCIAL

## 2024-04-11 VITALS
TEMPERATURE: 98.7 F | WEIGHT: 315 LBS | DIASTOLIC BLOOD PRESSURE: 96 MMHG | HEIGHT: 70 IN | BODY MASS INDEX: 45.1 KG/M2 | HEART RATE: 91 BPM | RESPIRATION RATE: 20 BRPM | SYSTOLIC BLOOD PRESSURE: 148 MMHG | OXYGEN SATURATION: 94 %

## 2024-04-11 DIAGNOSIS — E66.01 CLASS 3 SEVERE OBESITY DUE TO EXCESS CALORIES WITH SERIOUS COMORBIDITY AND BODY MASS INDEX (BMI) OF 45.0 TO 49.9 IN ADULT (H): ICD-10-CM

## 2024-04-11 DIAGNOSIS — I10 ESSENTIAL HYPERTENSION WITH GOAL BLOOD PRESSURE LESS THAN 140/90: Primary | ICD-10-CM

## 2024-04-11 DIAGNOSIS — Z13.220 SCREENING FOR HYPERLIPIDEMIA: ICD-10-CM

## 2024-04-11 DIAGNOSIS — E66.813 CLASS 3 SEVERE OBESITY DUE TO EXCESS CALORIES WITH SERIOUS COMORBIDITY AND BODY MASS INDEX (BMI) OF 45.0 TO 49.9 IN ADULT (H): ICD-10-CM

## 2024-04-11 PROCEDURE — 80053 COMPREHEN METABOLIC PANEL: CPT | Performed by: STUDENT IN AN ORGANIZED HEALTH CARE EDUCATION/TRAINING PROGRAM

## 2024-04-11 PROCEDURE — 80061 LIPID PANEL: CPT | Performed by: STUDENT IN AN ORGANIZED HEALTH CARE EDUCATION/TRAINING PROGRAM

## 2024-04-11 PROCEDURE — G2211 COMPLEX E/M VISIT ADD ON: HCPCS | Performed by: STUDENT IN AN ORGANIZED HEALTH CARE EDUCATION/TRAINING PROGRAM

## 2024-04-11 PROCEDURE — 36415 COLL VENOUS BLD VENIPUNCTURE: CPT | Performed by: STUDENT IN AN ORGANIZED HEALTH CARE EDUCATION/TRAINING PROGRAM

## 2024-04-11 PROCEDURE — 99214 OFFICE O/P EST MOD 30 MIN: CPT | Performed by: STUDENT IN AN ORGANIZED HEALTH CARE EDUCATION/TRAINING PROGRAM

## 2024-04-11 RX ORDER — LISINOPRIL 40 MG/1
40 TABLET ORAL DAILY
Qty: 90 TABLET | Refills: 3 | Status: SHIPPED | OUTPATIENT
Start: 2024-04-11

## 2024-04-11 RX ORDER — PHENTERMINE HYDROCHLORIDE 15 MG/1
15 CAPSULE ORAL EVERY MORNING
Qty: 30 CAPSULE | Refills: 0 | Status: SHIPPED | OUTPATIENT
Start: 2024-04-11

## 2024-04-12 LAB
ALBUMIN SERPL BCG-MCNC: 4.3 G/DL (ref 3.5–5.2)
ALP SERPL-CCNC: 109 U/L (ref 40–150)
ALT SERPL W P-5'-P-CCNC: 20 U/L (ref 0–70)
ANION GAP SERPL CALCULATED.3IONS-SCNC: 10 MMOL/L (ref 7–15)
AST SERPL W P-5'-P-CCNC: 22 U/L (ref 0–45)
BILIRUB SERPL-MCNC: 0.8 MG/DL
BUN SERPL-MCNC: 16.8 MG/DL (ref 6–20)
CALCIUM SERPL-MCNC: 9.9 MG/DL (ref 8.6–10)
CHLORIDE SERPL-SCNC: 105 MMOL/L (ref 98–107)
CHOLEST SERPL-MCNC: 203 MG/DL
CREAT SERPL-MCNC: 1.1 MG/DL (ref 0.67–1.17)
DEPRECATED HCO3 PLAS-SCNC: 23 MMOL/L (ref 22–29)
EGFRCR SERPLBLD CKD-EPI 2021: 78 ML/MIN/1.73M2
FASTING STATUS PATIENT QL REPORTED: YES
GLUCOSE SERPL-MCNC: 101 MG/DL (ref 70–99)
HDLC SERPL-MCNC: 37 MG/DL
LDLC SERPL CALC-MCNC: 134 MG/DL
NONHDLC SERPL-MCNC: 166 MG/DL
POTASSIUM SERPL-SCNC: 5.1 MMOL/L (ref 3.4–5.3)
PROT SERPL-MCNC: 7.5 G/DL (ref 6.4–8.3)
SODIUM SERPL-SCNC: 138 MMOL/L (ref 135–145)
TRIGL SERPL-MCNC: 162 MG/DL

## 2024-04-12 NOTE — TELEPHONE ENCOUNTER
PRIOR AUTHORIZATION DENIED    Medication: PHENTERMINE HCL 15 MG PO CAPS  Insurance Company: [employer] ([facility]) - Phone [phone] Fax [phone]  Denial Date: 4/11/2024  Denial Reason(s):     Appeal Information:     Patient Notified: No

## 2024-04-16 NOTE — TELEPHONE ENCOUNTER
"Left message on answering machine for patient to call back to the nurse at [phone].    Please give PCP message:    \"  Please call pt and inform him that his insurance does not cover phentermine without trying Saxenda. At this time he is already on a GLP-1. Another medication we can try is topamax if pt is interested.     [facility]   \"    Daina Ahumada RN  [facility]      "

## 2024-04-16 NOTE — TELEPHONE ENCOUNTER
Please call pt and inform him that his insurance does not cover phentermine without trying Saxenda. At this time he is already on a GLP-1. Another medication we can try is topamax if pt is interested.    [facility]

## 2024-04-17 ENCOUNTER — MYC MEDICAL ADVICE (OUTPATIENT)
Dept: FAMILY MEDICINE | Facility: CLINIC | Age: 59
End: 2024-04-17
Payer: COMMERCIAL

## 2024-04-17 DIAGNOSIS — E66.01 CLASS 3 SEVERE OBESITY DUE TO EXCESS CALORIES WITH SERIOUS COMORBIDITY AND BODY MASS INDEX (BMI) OF 45.0 TO 49.9 IN ADULT (H): Primary | ICD-10-CM

## 2024-04-17 DIAGNOSIS — E66.813 CLASS 3 SEVERE OBESITY DUE TO EXCESS CALORIES WITH SERIOUS COMORBIDITY AND BODY MASS INDEX (BMI) OF 45.0 TO 49.9 IN ADULT (H): Primary | ICD-10-CM

## 2024-04-17 RX ORDER — TOPIRAMATE 50 MG/1
50 TABLET, FILM COATED ORAL DAILY
Qty: 30 TABLET | Refills: 0 | Status: SHIPPED | OUTPATIENT
Start: 2024-04-17 | End: 2024-05-26

## 2024-04-17 NOTE — TELEPHONE ENCOUNTER
Patient views [employer], last login was today. [employer] message sent to patient.     Thanks,  ELENA Shipley  [facility]

## 2024-04-17 NOTE — TELEPHONE ENCOUNTER
Last read by kristine Dasilva at  1:10 PM on 4/17/2024.     Closing encounter as patient had reviewed MyChart, message routed to provider.    BK HullN RN  [facility]

## 2024-05-22 ENCOUNTER — MYC REFILL (OUTPATIENT)
Dept: FAMILY MEDICINE | Facility: CLINIC | Age: 59
End: 2024-05-22
Payer: COMMERCIAL

## 2024-05-22 DIAGNOSIS — E66.01 CLASS 3 SEVERE OBESITY DUE TO EXCESS CALORIES WITH SERIOUS COMORBIDITY AND BODY MASS INDEX (BMI) OF 45.0 TO 49.9 IN ADULT (H): ICD-10-CM

## 2024-05-22 DIAGNOSIS — I10 ESSENTIAL HYPERTENSION WITH GOAL BLOOD PRESSURE LESS THAN 140/90: ICD-10-CM

## 2024-05-22 DIAGNOSIS — E66.813 CLASS 3 SEVERE OBESITY DUE TO EXCESS CALORIES WITH SERIOUS COMORBIDITY AND BODY MASS INDEX (BMI) OF 45.0 TO 49.9 IN ADULT (H): ICD-10-CM

## 2024-05-25 DIAGNOSIS — E66.813 CLASS 3 SEVERE OBESITY DUE TO EXCESS CALORIES WITH SERIOUS COMORBIDITY AND BODY MASS INDEX (BMI) OF 45.0 TO 49.9 IN ADULT (H): ICD-10-CM

## 2024-05-25 DIAGNOSIS — E66.01 CLASS 3 SEVERE OBESITY DUE TO EXCESS CALORIES WITH SERIOUS COMORBIDITY AND BODY MASS INDEX (BMI) OF 45.0 TO 49.9 IN ADULT (H): ICD-10-CM

## 2024-05-26 RX ORDER — TOPIRAMATE 50 MG/1
50 TABLET, FILM COATED ORAL DAILY
Qty: 30 TABLET | Refills: 0 | Status: SHIPPED | OUTPATIENT
Start: 2024-05-26 | End: 2024-07-05

## 2024-06-29 ENCOUNTER — HEALTH MAINTENANCE LETTER (OUTPATIENT)
Age: 59
End: 2024-06-29

## 2024-07-05 ENCOUNTER — MYC REFILL (OUTPATIENT)
Dept: FAMILY MEDICINE | Facility: CLINIC | Age: 59
End: 2024-07-05
Payer: COMMERCIAL

## 2024-07-05 DIAGNOSIS — E66.01 CLASS 3 SEVERE OBESITY DUE TO EXCESS CALORIES WITH SERIOUS COMORBIDITY AND BODY MASS INDEX (BMI) OF 45.0 TO 49.9 IN ADULT (H): ICD-10-CM

## 2024-07-05 DIAGNOSIS — E66.813 CLASS 3 SEVERE OBESITY DUE TO EXCESS CALORIES WITH SERIOUS COMORBIDITY AND BODY MASS INDEX (BMI) OF 45.0 TO 49.9 IN ADULT (H): ICD-10-CM

## 2024-07-05 DIAGNOSIS — I10 ESSENTIAL HYPERTENSION WITH GOAL BLOOD PRESSURE LESS THAN 140/90: ICD-10-CM

## 2024-07-08 RX ORDER — TOPIRAMATE 50 MG/1
50 TABLET, FILM COATED ORAL DAILY
Qty: 30 TABLET | Refills: 0 | Status: SHIPPED | OUTPATIENT
Start: 2024-07-08 | End: 2024-08-23

## 2024-08-22 DIAGNOSIS — E66.813 CLASS 3 SEVERE OBESITY DUE TO EXCESS CALORIES WITH SERIOUS COMORBIDITY AND BODY MASS INDEX (BMI) OF 45.0 TO 49.9 IN ADULT (H): ICD-10-CM

## 2024-08-22 DIAGNOSIS — E66.01 CLASS 3 SEVERE OBESITY DUE TO EXCESS CALORIES WITH SERIOUS COMORBIDITY AND BODY MASS INDEX (BMI) OF 45.0 TO 49.9 IN ADULT (H): ICD-10-CM

## 2024-08-23 RX ORDER — TOPIRAMATE 50 MG/1
50 TABLET, FILM COATED ORAL DAILY
Qty: 30 TABLET | Refills: 0 | Status: SHIPPED | OUTPATIENT
Start: 2024-08-23 | End: 2024-10-07

## 2024-08-31 ENCOUNTER — MYC REFILL (OUTPATIENT)
Dept: FAMILY MEDICINE | Facility: CLINIC | Age: 59
End: 2024-08-31
Payer: COMMERCIAL

## 2024-08-31 DIAGNOSIS — E66.01 CLASS 3 SEVERE OBESITY DUE TO EXCESS CALORIES WITH SERIOUS COMORBIDITY AND BODY MASS INDEX (BMI) OF 45.0 TO 49.9 IN ADULT (H): ICD-10-CM

## 2024-08-31 DIAGNOSIS — I10 ESSENTIAL HYPERTENSION WITH GOAL BLOOD PRESSURE LESS THAN 140/90: ICD-10-CM

## 2024-08-31 DIAGNOSIS — E66.813 CLASS 3 SEVERE OBESITY DUE TO EXCESS CALORIES WITH SERIOUS COMORBIDITY AND BODY MASS INDEX (BMI) OF 45.0 TO 49.9 IN ADULT (H): ICD-10-CM

## 2024-10-06 DIAGNOSIS — E66.813 CLASS 3 SEVERE OBESITY DUE TO EXCESS CALORIES WITH SERIOUS COMORBIDITY AND BODY MASS INDEX (BMI) OF 45.0 TO 49.9 IN ADULT (H): ICD-10-CM

## 2024-10-06 DIAGNOSIS — E66.01 CLASS 3 SEVERE OBESITY DUE TO EXCESS CALORIES WITH SERIOUS COMORBIDITY AND BODY MASS INDEX (BMI) OF 45.0 TO 49.9 IN ADULT (H): ICD-10-CM

## 2024-10-07 RX ORDER — TOPIRAMATE 50 MG/1
50 TABLET, FILM COATED ORAL DAILY
Qty: 30 TABLET | Refills: 0 | Status: SHIPPED | OUTPATIENT
Start: 2024-10-07

## 2024-10-29 ENCOUNTER — MYC REFILL (OUTPATIENT)
Dept: FAMILY MEDICINE | Facility: CLINIC | Age: 59
End: 2024-10-29
Payer: COMMERCIAL

## 2024-10-29 DIAGNOSIS — I10 ESSENTIAL HYPERTENSION WITH GOAL BLOOD PRESSURE LESS THAN 140/90: ICD-10-CM

## 2024-10-29 DIAGNOSIS — E66.813 CLASS 3 SEVERE OBESITY DUE TO EXCESS CALORIES WITH SERIOUS COMORBIDITY AND BODY MASS INDEX (BMI) OF 45.0 TO 49.9 IN ADULT (H): ICD-10-CM

## 2024-10-29 DIAGNOSIS — E66.01 CLASS 3 SEVERE OBESITY DUE TO EXCESS CALORIES WITH SERIOUS COMORBIDITY AND BODY MASS INDEX (BMI) OF 45.0 TO 49.9 IN ADULT (H): ICD-10-CM

## 2024-11-13 ENCOUNTER — MYC REFILL (OUTPATIENT)
Dept: FAMILY MEDICINE | Facility: CLINIC | Age: 59
End: 2024-11-13
Payer: COMMERCIAL

## 2024-11-13 DIAGNOSIS — E66.01 CLASS 3 SEVERE OBESITY DUE TO EXCESS CALORIES WITH SERIOUS COMORBIDITY AND BODY MASS INDEX (BMI) OF 45.0 TO 49.9 IN ADULT (H): ICD-10-CM

## 2024-11-13 DIAGNOSIS — E66.813 CLASS 3 SEVERE OBESITY DUE TO EXCESS CALORIES WITH SERIOUS COMORBIDITY AND BODY MASS INDEX (BMI) OF 45.0 TO 49.9 IN ADULT (H): ICD-10-CM

## 2024-11-14 RX ORDER — TOPIRAMATE 50 MG/1
50 TABLET, FILM COATED ORAL DAILY
Qty: 30 TABLET | Refills: 0 | Status: SHIPPED | OUTPATIENT
Start: 2024-11-14

## 2024-11-18 ENCOUNTER — MYC REFILL (OUTPATIENT)
Dept: FAMILY MEDICINE | Facility: CLINIC | Age: 59
End: 2024-11-18
Payer: COMMERCIAL

## 2024-11-18 DIAGNOSIS — I10 ESSENTIAL HYPERTENSION WITH GOAL BLOOD PRESSURE LESS THAN 140/90: ICD-10-CM

## 2024-11-18 RX ORDER — LISINOPRIL 40 MG/1
40 TABLET ORAL DAILY
Qty: 90 TABLET | Refills: 3 | OUTPATIENT
Start: 2024-11-18

## 2024-12-08 ENCOUNTER — MYC REFILL (OUTPATIENT)
Dept: FAMILY MEDICINE | Facility: CLINIC | Age: 59
End: 2024-12-08
Payer: COMMERCIAL

## 2024-12-08 DIAGNOSIS — E66.01 CLASS 3 SEVERE OBESITY DUE TO EXCESS CALORIES WITH SERIOUS COMORBIDITY AND BODY MASS INDEX (BMI) OF 45.0 TO 49.9 IN ADULT (H): ICD-10-CM

## 2024-12-08 DIAGNOSIS — I10 ESSENTIAL HYPERTENSION WITH GOAL BLOOD PRESSURE LESS THAN 140/90: ICD-10-CM

## 2024-12-08 DIAGNOSIS — E66.813 CLASS 3 SEVERE OBESITY DUE TO EXCESS CALORIES WITH SERIOUS COMORBIDITY AND BODY MASS INDEX (BMI) OF 45.0 TO 49.9 IN ADULT (H): ICD-10-CM

## 2025-02-20 ENCOUNTER — MYC REFILL (OUTPATIENT)
Dept: FAMILY MEDICINE | Facility: CLINIC | Age: 60
End: 2025-02-20

## 2025-02-20 ENCOUNTER — E-VISIT (OUTPATIENT)
Dept: FAMILY MEDICINE | Facility: CLINIC | Age: 60
End: 2025-02-20

## 2025-02-20 DIAGNOSIS — M54.6 ACUTE BILATERAL THORACIC BACK PAIN: Primary | ICD-10-CM

## 2025-02-20 DIAGNOSIS — I10 ESSENTIAL HYPERTENSION WITH GOAL BLOOD PRESSURE LESS THAN 140/90: ICD-10-CM

## 2025-02-20 DIAGNOSIS — E66.813 CLASS 3 SEVERE OBESITY DUE TO EXCESS CALORIES WITH SERIOUS COMORBIDITY AND BODY MASS INDEX (BMI) OF 45.0 TO 49.9 IN ADULT (H): ICD-10-CM

## 2025-02-20 DIAGNOSIS — E66.01 CLASS 3 SEVERE OBESITY DUE TO EXCESS CALORIES WITH SERIOUS COMORBIDITY AND BODY MASS INDEX (BMI) OF 45.0 TO 49.9 IN ADULT (H): ICD-10-CM

## 2025-02-20 RX ORDER — TOPIRAMATE 50 MG/1
50 TABLET, FILM COATED ORAL DAILY
Qty: 30 TABLET | Refills: 0 | Status: SHIPPED | OUTPATIENT
Start: 2025-02-20

## 2025-02-20 RX ORDER — LISINOPRIL 40 MG/1
40 TABLET ORAL DAILY
Qty: 90 TABLET | Refills: 3 | OUTPATIENT
Start: 2025-02-20

## 2025-02-27 ENCOUNTER — TELEPHONE (OUTPATIENT)
Dept: FAMILY MEDICINE | Facility: CLINIC | Age: 60
End: 2025-02-27
Payer: COMMERCIAL

## 2025-02-27 NOTE — TELEPHONE ENCOUNTER
Prior Authorization Retail Medication Request    Medication/Dose: Wegovy   Diagnosis and ICD code (if different than what is on RX):    New/renewal/insurance change PA/secondary ins. PA:  Previously Tried and Failed:    Rationale:      Insurance   Primary: [employer]  Insurance ID:  [id number]      Secondary (if applicable):  Insurance ID:      Pharmacy Information (if different than what is on RX)  Name:  [facility]  Phone:  [phone]  Fax:[phone]    Clinic Information  Preferred routing pool for dept communication:

## 2025-02-28 ENCOUNTER — MYC MEDICAL ADVICE (OUTPATIENT)
Dept: FAMILY MEDICINE | Facility: CLINIC | Age: 60
End: 2025-02-28
Payer: COMMERCIAL

## 2025-03-03 NOTE — TELEPHONE ENCOUNTER
PA Initiation    Medication: WEGOVY 2.4 MG/0.75ML SC SOAJ  Insurance Company: [employer] - Phone [phone] Fax [phone]  Pharmacy Filling the Rx: [id number] - [place], [place] - [address]   Filling Pharmacy Phone: [phone]  Filling Pharmacy Fax: [phone]  Start Date: 3/3/2025

## 2025-03-04 NOTE — TELEPHONE ENCOUNTER
Prior Authorization Approval    Medication: WEGOVY 2.4 MG/0.75ML SC SOAJ  Authorization Effective Date: 1/1/2025  Authorization Expiration Date: 9/4/2025  Approved Dose/Quantity: Up to 4 pens per 28 days  Reference #: [id number]   Insurance Company: [employer] - Phone [phone] Fax [phone]  Which Pharmacy is filling the prescription: [id number] - [place], [place] - [address]   Pharmacy Notified: YES  Patient Notified: YES

## 2025-04-09 ENCOUNTER — MYC REFILL (OUTPATIENT)
Dept: FAMILY MEDICINE | Facility: CLINIC | Age: 60
End: 2025-04-09
Payer: COMMERCIAL

## 2025-04-09 DIAGNOSIS — E66.813 CLASS 3 SEVERE OBESITY DUE TO EXCESS CALORIES WITH SERIOUS COMORBIDITY AND BODY MASS INDEX (BMI) OF 45.0 TO 49.9 IN ADULT (H): ICD-10-CM

## 2025-04-09 DIAGNOSIS — E66.01 CLASS 3 SEVERE OBESITY DUE TO EXCESS CALORIES WITH SERIOUS COMORBIDITY AND BODY MASS INDEX (BMI) OF 45.0 TO 49.9 IN ADULT (H): ICD-10-CM

## 2025-04-09 DIAGNOSIS — I10 ESSENTIAL HYPERTENSION WITH GOAL BLOOD PRESSURE LESS THAN 140/90: ICD-10-CM

## 2025-05-10 ENCOUNTER — MYC REFILL (OUTPATIENT)
Dept: FAMILY MEDICINE | Facility: CLINIC | Age: 60
End: 2025-05-10
Payer: COMMERCIAL

## 2025-05-10 DIAGNOSIS — I10 ESSENTIAL HYPERTENSION WITH GOAL BLOOD PRESSURE LESS THAN 140/90: ICD-10-CM

## 2025-05-10 DIAGNOSIS — E66.813 CLASS 3 SEVERE OBESITY DUE TO EXCESS CALORIES WITH SERIOUS COMORBIDITY AND BODY MASS INDEX (BMI) OF 45.0 TO 49.9 IN ADULT (H): ICD-10-CM

## 2025-05-26 ENCOUNTER — MYC REFILL (OUTPATIENT)
Dept: FAMILY MEDICINE | Facility: CLINIC | Age: 60
End: 2025-05-26
Payer: COMMERCIAL

## 2025-05-26 DIAGNOSIS — I10 ESSENTIAL HYPERTENSION WITH GOAL BLOOD PRESSURE LESS THAN 140/90: ICD-10-CM

## 2025-05-27 RX ORDER — LISINOPRIL 40 MG/1
40 TABLET ORAL DAILY
Qty: 90 TABLET | Refills: 3 | Status: SHIPPED | OUTPATIENT
Start: 2025-05-27

## 2025-06-29 ENCOUNTER — MYC REFILL (OUTPATIENT)
Dept: FAMILY MEDICINE | Facility: CLINIC | Age: 60
End: 2025-06-29
Payer: COMMERCIAL

## 2025-06-29 DIAGNOSIS — E66.813 CLASS 3 SEVERE OBESITY DUE TO EXCESS CALORIES WITH SERIOUS COMORBIDITY AND BODY MASS INDEX (BMI) OF 45.0 TO 49.9 IN ADULT (H): ICD-10-CM

## 2025-06-29 DIAGNOSIS — I10 ESSENTIAL HYPERTENSION WITH GOAL BLOOD PRESSURE LESS THAN 140/90: ICD-10-CM

## 2025-07-13 ENCOUNTER — HEALTH MAINTENANCE LETTER (OUTPATIENT)
Age: 60
End: 2025-07-13

## 2025-07-31 ENCOUNTER — MYC REFILL (OUTPATIENT)
Dept: FAMILY MEDICINE | Facility: CLINIC | Age: 60
End: 2025-07-31
Payer: COMMERCIAL

## 2025-07-31 DIAGNOSIS — I10 ESSENTIAL HYPERTENSION WITH GOAL BLOOD PRESSURE LESS THAN 140/90: ICD-10-CM

## 2025-07-31 DIAGNOSIS — E66.813 CLASS 3 SEVERE OBESITY DUE TO EXCESS CALORIES WITH SERIOUS COMORBIDITY AND BODY MASS INDEX (BMI) OF 45.0 TO 49.9 IN ADULT (H): ICD-10-CM
